# Patient Record
Sex: FEMALE | Race: ASIAN | NOT HISPANIC OR LATINO
[De-identification: names, ages, dates, MRNs, and addresses within clinical notes are randomized per-mention and may not be internally consistent; named-entity substitution may affect disease eponyms.]

---

## 2018-07-13 ENCOUNTER — APPOINTMENT (OUTPATIENT)
Dept: CARDIOLOGY | Facility: CLINIC | Age: 43
End: 2018-07-13
Payer: MEDICAID

## 2018-07-13 ENCOUNTER — NON-APPOINTMENT (OUTPATIENT)
Age: 43
End: 2018-07-13

## 2018-07-13 VITALS
BODY MASS INDEX: 26.2 KG/M2 | WEIGHT: 163 LBS | SYSTOLIC BLOOD PRESSURE: 127 MMHG | HEART RATE: 76 BPM | DIASTOLIC BLOOD PRESSURE: 85 MMHG | HEIGHT: 66 IN | OXYGEN SATURATION: 100 %

## 2018-07-13 PROCEDURE — 93000 ELECTROCARDIOGRAM COMPLETE: CPT

## 2018-07-13 PROCEDURE — 99214 OFFICE O/P EST MOD 30 MIN: CPT

## 2018-07-13 PROCEDURE — 93306 TTE W/DOPPLER COMPLETE: CPT

## 2018-07-22 ENCOUNTER — EMERGENCY (EMERGENCY)
Facility: HOSPITAL | Age: 43
LOS: 1 days | Discharge: HOME | End: 2018-07-22
Attending: EMERGENCY MEDICINE
Payer: MEDICAID

## 2018-07-22 VITALS
TEMPERATURE: 97 F | SYSTOLIC BLOOD PRESSURE: 123 MMHG | HEART RATE: 57 BPM | RESPIRATION RATE: 16 BRPM | DIASTOLIC BLOOD PRESSURE: 78 MMHG | HEIGHT: 66 IN | WEIGHT: 158.07 LBS | OXYGEN SATURATION: 100 %

## 2018-07-22 VITALS
TEMPERATURE: 98 F | OXYGEN SATURATION: 100 % | DIASTOLIC BLOOD PRESSURE: 54 MMHG | HEART RATE: 72 BPM | SYSTOLIC BLOOD PRESSURE: 104 MMHG | RESPIRATION RATE: 16 BRPM

## 2018-07-22 DIAGNOSIS — Z95.2 PRESENCE OF PROSTHETIC HEART VALVE: Chronic | ICD-10-CM

## 2018-07-22 LAB
ALBUMIN SERPL ELPH-MCNC: 3.7 G/DL — SIGNIFICANT CHANGE UP (ref 3.5–5)
ALP SERPL-CCNC: 94 U/L — SIGNIFICANT CHANGE UP (ref 40–120)
ALT FLD-CCNC: 22 U/L DA — SIGNIFICANT CHANGE UP (ref 10–60)
ANION GAP SERPL CALC-SCNC: 8 MMOL/L — SIGNIFICANT CHANGE UP (ref 5–17)
APPEARANCE UR: CLEAR — SIGNIFICANT CHANGE UP
AST SERPL-CCNC: 15 U/L — SIGNIFICANT CHANGE UP (ref 10–40)
BACTERIA # UR AUTO: ABNORMAL /HPF
BILIRUB SERPL-MCNC: 0.2 MG/DL — SIGNIFICANT CHANGE UP (ref 0.2–1.2)
BILIRUB UR-MCNC: NEGATIVE — SIGNIFICANT CHANGE UP
BUN SERPL-MCNC: 15 MG/DL — SIGNIFICANT CHANGE UP (ref 7–18)
CALCIUM SERPL-MCNC: 9 MG/DL — SIGNIFICANT CHANGE UP (ref 8.4–10.5)
CHLORIDE SERPL-SCNC: 105 MMOL/L — SIGNIFICANT CHANGE UP (ref 96–108)
CO2 SERPL-SCNC: 24 MMOL/L — SIGNIFICANT CHANGE UP (ref 22–31)
COLOR SPEC: YELLOW — SIGNIFICANT CHANGE UP
COMMENT - URINE: SIGNIFICANT CHANGE UP
CREAT SERPL-MCNC: 0.82 MG/DL — SIGNIFICANT CHANGE UP (ref 0.5–1.3)
DIFF PNL FLD: ABNORMAL
EPI CELLS # UR: ABNORMAL /HPF
GLUCOSE SERPL-MCNC: 129 MG/DL — HIGH (ref 70–99)
GLUCOSE UR QL: NEGATIVE — SIGNIFICANT CHANGE UP
HCT VFR BLD CALC: 36.9 % — SIGNIFICANT CHANGE UP (ref 34.5–45)
HGB BLD-MCNC: 11.6 G/DL — SIGNIFICANT CHANGE UP (ref 11.5–15.5)
HYALINE CASTS # UR AUTO: ABNORMAL /LPF
KETONES UR-MCNC: NEGATIVE — SIGNIFICANT CHANGE UP
LEUKOCYTE ESTERASE UR-ACNC: NEGATIVE — SIGNIFICANT CHANGE UP
LIDOCAIN IGE QN: 169 U/L — SIGNIFICANT CHANGE UP (ref 73–393)
MCHC RBC-ENTMCNC: 26.1 PG — LOW (ref 27–34)
MCHC RBC-ENTMCNC: 31.5 GM/DL — LOW (ref 32–36)
MCV RBC AUTO: 82.6 FL — SIGNIFICANT CHANGE UP (ref 80–100)
NITRITE UR-MCNC: NEGATIVE — SIGNIFICANT CHANGE UP
PH UR: 7 — SIGNIFICANT CHANGE UP (ref 5–8)
PLATELET # BLD AUTO: 304 K/UL — SIGNIFICANT CHANGE UP (ref 150–400)
POTASSIUM SERPL-MCNC: 4.2 MMOL/L — SIGNIFICANT CHANGE UP (ref 3.5–5.3)
POTASSIUM SERPL-SCNC: 4.2 MMOL/L — SIGNIFICANT CHANGE UP (ref 3.5–5.3)
PROT SERPL-MCNC: 7.7 G/DL — SIGNIFICANT CHANGE UP (ref 6–8.3)
PROT UR-MCNC: 15
RBC # BLD: 4.47 M/UL — SIGNIFICANT CHANGE UP (ref 3.8–5.2)
RBC # FLD: 15.4 % — HIGH (ref 10.3–14.5)
RBC CASTS # UR COMP ASSIST: SIGNIFICANT CHANGE UP /HPF (ref 0–2)
SODIUM SERPL-SCNC: 137 MMOL/L — SIGNIFICANT CHANGE UP (ref 135–145)
SP GR SPEC: 1.01 — SIGNIFICANT CHANGE UP (ref 1.01–1.02)
UROBILINOGEN FLD QL: NEGATIVE — SIGNIFICANT CHANGE UP
WBC # BLD: 13.4 K/UL — HIGH (ref 3.8–10.5)
WBC # FLD AUTO: 13.4 K/UL — HIGH (ref 3.8–10.5)
WBC UR QL: SIGNIFICANT CHANGE UP /HPF (ref 0–5)

## 2018-07-22 PROCEDURE — 83690 ASSAY OF LIPASE: CPT

## 2018-07-22 PROCEDURE — 71046 X-RAY EXAM CHEST 2 VIEWS: CPT

## 2018-07-22 PROCEDURE — 93005 ELECTROCARDIOGRAM TRACING: CPT

## 2018-07-22 PROCEDURE — 99283 EMERGENCY DEPT VISIT LOW MDM: CPT | Mod: 25

## 2018-07-22 PROCEDURE — 80053 COMPREHEN METABOLIC PANEL: CPT

## 2018-07-22 PROCEDURE — 81001 URINALYSIS AUTO W/SCOPE: CPT

## 2018-07-22 PROCEDURE — 85027 COMPLETE CBC AUTOMATED: CPT

## 2018-07-22 PROCEDURE — 99285 EMERGENCY DEPT VISIT HI MDM: CPT

## 2018-07-22 PROCEDURE — 71046 X-RAY EXAM CHEST 2 VIEWS: CPT | Mod: 26

## 2018-07-22 NOTE — ED ADULT NURSE NOTE - OBJECTIVE STATEMENT
pt came to er c/o abdominal pain , pain is mostly felt in the epigastric area. vomited x 1 in er .Labs pending

## 2018-07-22 NOTE — ED PROVIDER NOTE - MEDICAL DECISION MAKING DETAILS
41 y/o F pt with PMHx of heart problems. Pt worried about sx being related to extensive history of heart issues. Discussed with pt r/b/a for workup including xray and IV fluids. Pt agrees to ruling out any liver, pancreas, and kidney issues before moving forward. 43yo female with epigastric and abdo cramping w n/v. no cp sobm, lwo likelihood ACS given HPI, will cehck electrolyes and lipase. cxr for free air. dispo per

## 2018-07-22 NOTE — ED PROVIDER NOTE - PROGRESS NOTE DETAILS
sleeping, comfortable, abdo soft nontender  d/w pt lab results, copy provided, d/w rba of CT, declines at this time, will return if worsens.

## 2018-07-22 NOTE — ED PROVIDER NOTE - OBJECTIVE STATEMENT
43 y/o F pt with a PMHx of Heart Disease and a PSHx of Mitral Valve Replacement and Aortic Valve Replacement presents to ED c/o abdominal pain since yesterday. Pt also notes an episode of vomiting when she arrived to [facility]. Pt states that taking antacid provided some relief for her pain. Pt also states she had similar symptoms when she had a virus "a long time ago". Pt denies smoking or drug use. Allergies: Vancomycin (unknown), Penicillin (unknown). 41 y/o F pt with a PSHx of Mitral Valve Replacement and Aortic Valve Replacement presents to ED c/o abdominal pain since yesterday. Pt also notes an episode of vomiting when she arrived to [facility] ED. Pt states that taking antacid provided some relief for her pain. Pt also states she had similar symptoms when she had a virus "a long time ago". Pt denies smoking or drug use. Allergies: Vancomycin (unknown), Penicillin (unknown).  There is no CP SOB or fevers ro chills, No sick contacts, no injury or trauma, no travel.

## 2019-07-26 ENCOUNTER — NON-APPOINTMENT (OUTPATIENT)
Age: 44
End: 2019-07-26

## 2019-07-26 ENCOUNTER — APPOINTMENT (OUTPATIENT)
Dept: CARDIOLOGY | Facility: CLINIC | Age: 44
End: 2019-07-26
Payer: MEDICAID

## 2019-07-26 VITALS
OXYGEN SATURATION: 100 % | HEIGHT: 66 IN | SYSTOLIC BLOOD PRESSURE: 116 MMHG | RESPIRATION RATE: 16 BRPM | HEART RATE: 68 BPM | DIASTOLIC BLOOD PRESSURE: 80 MMHG | BODY MASS INDEX: 24.27 KG/M2 | WEIGHT: 151 LBS

## 2019-07-26 PROCEDURE — 93000 ELECTROCARDIOGRAM COMPLETE: CPT

## 2019-07-26 PROCEDURE — 99214 OFFICE O/P EST MOD 30 MIN: CPT

## 2020-01-07 ENCOUNTER — APPOINTMENT (OUTPATIENT)
Dept: GYNECOLOGIC ONCOLOGY | Facility: CLINIC | Age: 45
End: 2020-01-07
Payer: MEDICAID

## 2020-01-07 VITALS
DIASTOLIC BLOOD PRESSURE: 79 MMHG | BODY MASS INDEX: 24.93 KG/M2 | HEIGHT: 66 IN | HEART RATE: 72 BPM | WEIGHT: 155.13 LBS | SYSTOLIC BLOOD PRESSURE: 121 MMHG

## 2020-01-07 DIAGNOSIS — Z92.89 PERSONAL HISTORY OF OTHER MEDICAL TREATMENT: ICD-10-CM

## 2020-01-07 DIAGNOSIS — Z80.3 FAMILY HISTORY OF MALIGNANT NEOPLASM OF BREAST: ICD-10-CM

## 2020-01-07 DIAGNOSIS — Z78.9 OTHER SPECIFIED HEALTH STATUS: ICD-10-CM

## 2020-01-07 DIAGNOSIS — R97.8 OTHER ABNORMAL TUMOR MARKERS: ICD-10-CM

## 2020-01-07 PROCEDURE — 99205 OFFICE O/P NEW HI 60 MIN: CPT

## 2020-01-07 NOTE — OB HISTORY
[Total Preg ___] : : [unfilled] [Full Term ___] : [unfilled] (full-term) [Living ___] : [unfilled] (living) [Vaginal ___] : [unfilled] vaginal delivery(s) [AB Spont ___] : [unfilled] miscarriage(s) [Definite ___ (Date)] : the last menstrual period was [unfilled] [Menarche Age ____] : age at menarche was [unfilled]

## 2020-01-13 NOTE — LETTER BODY
[I had the pleasure of evaluating your patient, [unfilled] for ___] : I had the pleasure of evaluating your patient, [unfilled] for [unfilled]. [Dear  ___] : Dear  [unfilled], [Attached please find my note.] : Attached please find my note.

## 2020-01-13 NOTE — HISTORY OF PRESENT ILLNESS
[FreeTextEntry1] : Patient is a 44 y.o. G 4  P  3  being referred by Dr. Dr. Gonzalez for the evaluation and\par management of elevated tumor markers, heavy irregular bleeding.\par \par Patient states has had irregular bleeding X's 1 year (worsened) with abnormal tumor markers, and\par uterine polyps. GYN is unsure regarding hysterectomy and source of abn tumor markers\par Hx of blood transfusion due to valve surgery.\par \par Pain +dysmenorrhea\par Menses q 2 weeks X's 2-3 weeks X's 1 year\par Pressure/bloating both\par Change in bowel/bladder habits constipated due to iron supplements\par Sexually active denies\par \par 11/1/2019 Endometrial Polyp ([place])\par Proliferative endometrium. No polyp seen\par \par 11/26/2019 MRI Pelvis [facility]\par Impression:\par Enlarged uterus 13.3 x 9.3 x 9.7 cm with adenomyosis. No discrete measurable fibroid\par An endometrial polyp 2 x 0.7 x 0.9 cm\par Simple cyst left ovary 2.9 x 2.6 x 2.4 cm consistent with a dominant follicle or corpus luteum\par Trace amount of fluid within the pelvis most likely physiologic in nature\par \par \par 10/29/19 [facility] Maternal fetal medicine\par 3.3 cm fibroid\par 2.4 cm left ovarian cyst\par free fluid\par Endo 16 with 0.5 cm polyp and cystic lesion increased in vascularity\par \par 12/6/2019 Tumor Markers\par ROBERT premenopausal 1.94 (H) <1.31\par                           202 (H)  <35\par HE4,Ovarian CA              74\par \par 12/6/2019 \par                            343 (H)  <35\par 11/1/2019 9/23/19\par                            175 (H)  <35          103 (H)\par \par 12/6/2019 ROBERT                                               9/23/19\par ROBERT Premenopausal    121                              1.63(H)\par                             113 (H) <35                   78(H)\par HE4,Ovarian CA                60                                70\par \par 9/23/2019  H&H:    9.5&31.6\par \par Myriad My Risk BRCA: negative

## 2020-01-13 NOTE — REVIEW OF SYSTEMS
[Negative] : Musculoskeletal [Abn Vag Bleeding] : abnormal vaginal bleeding [FreeTextEntry4] : as per HPI

## 2020-01-13 NOTE — CHIEF COMPLAINT
[FreeTextEntry1] : Referred by Dr. Dr. Gonzalez for the evaluation and management of elevated tumor markers & heavy irregular bleeding.

## 2020-01-13 NOTE — DISCUSSION/SUMMARY
[Reviewed Clinical Lab Test(s)] : Results of clinical tests were reviewed. [Reviewed Radiology Report(s)] : Radiology reports were reviewed. [FreeTextEntry1] : \par Plan progesterone IUD insertion once insurance approval, as patient is hesitant to consider definitive hysterectomy after thorough discussion of the medical & surgical management options for premenopausal menometrorrhagia,  Consider hysteroscopic removal of the polyp with IUD insertion.\par \par The nature of the  & ROBERT tests were discussed including the risk of false positive values in the setting of adenomyosis, endometriosis, inflammation.  Both current imaging modalities do not have findings suspicious for malignancy, however I explained that future diagnostic laparoscopy may be necessary for further evaluation.  Plan short interval repeat imaging (3 months from previous) to confirm ovarian cyst resolution & stability of the findings with repeat  at next office visit in 2 months.  The need for surgical intervention will determined at that time based on review of the results.  \par \par She verbalized an understanding and her questions were answered to her apparent satisfaction.

## 2020-01-13 NOTE — PHYSICAL EXAM
[Normal] : Recto-Vaginal Exam: Normal [FreeTextEntry1] : Pleasant, comfortable [de-identified] : no mass palpated [de-identified] : globular 12 week sized, anteverted, mobile, nontender [de-identified] : No rectovaginal nodularity

## 2020-01-22 ENCOUNTER — OUTPATIENT (OUTPATIENT)
Dept: OUTPATIENT SERVICES | Facility: HOSPITAL | Age: 45
LOS: 1 days | End: 2020-01-22
Payer: MEDICAID

## 2020-01-22 VITALS
WEIGHT: 151.9 LBS | OXYGEN SATURATION: 99 % | HEART RATE: 68 BPM | DIASTOLIC BLOOD PRESSURE: 60 MMHG | HEIGHT: 66 IN | SYSTOLIC BLOOD PRESSURE: 120 MMHG | RESPIRATION RATE: 18 BRPM | TEMPERATURE: 99 F

## 2020-01-22 DIAGNOSIS — N92.4 EXCESSIVE BLEEDING IN THE PREMENOPAUSAL PERIOD: ICD-10-CM

## 2020-01-22 DIAGNOSIS — Z95.2 PRESENCE OF PROSTHETIC HEART VALVE: Chronic | ICD-10-CM

## 2020-01-22 LAB
ANION GAP SERPL CALC-SCNC: 9 MMO/L — SIGNIFICANT CHANGE UP (ref 7–14)
BUN SERPL-MCNC: 13 MG/DL — SIGNIFICANT CHANGE UP (ref 7–23)
CALCIUM SERPL-MCNC: 9.1 MG/DL — SIGNIFICANT CHANGE UP (ref 8.4–10.5)
CHLORIDE SERPL-SCNC: 104 MMOL/L — SIGNIFICANT CHANGE UP (ref 98–107)
CO2 SERPL-SCNC: 25 MMOL/L — SIGNIFICANT CHANGE UP (ref 22–31)
CREAT SERPL-MCNC: 0.74 MG/DL — SIGNIFICANT CHANGE UP (ref 0.5–1.3)
GLUCOSE SERPL-MCNC: 106 MG/DL — HIGH (ref 70–99)
HCT VFR BLD CALC: 32 % — LOW (ref 34.5–45)
HGB BLD-MCNC: 9.8 G/DL — LOW (ref 11.5–15.5)
MCHC RBC-ENTMCNC: 27.1 PG — SIGNIFICANT CHANGE UP (ref 27–34)
MCHC RBC-ENTMCNC: 30.6 % — LOW (ref 32–36)
MCV RBC AUTO: 88.6 FL — SIGNIFICANT CHANGE UP (ref 80–100)
NRBC # FLD: 0 K/UL — SIGNIFICANT CHANGE UP (ref 0–0)
PLATELET # BLD AUTO: 445 K/UL — HIGH (ref 150–400)
PMV BLD: 10.9 FL — SIGNIFICANT CHANGE UP (ref 7–13)
POTASSIUM SERPL-MCNC: 4 MMOL/L — SIGNIFICANT CHANGE UP (ref 3.5–5.3)
POTASSIUM SERPL-SCNC: 4 MMOL/L — SIGNIFICANT CHANGE UP (ref 3.5–5.3)
RBC # BLD: 3.61 M/UL — LOW (ref 3.8–5.2)
RBC # FLD: 16.2 % — HIGH (ref 10.3–14.5)
SODIUM SERPL-SCNC: 138 MMOL/L — SIGNIFICANT CHANGE UP (ref 135–145)
WBC # BLD: 8.06 K/UL — SIGNIFICANT CHANGE UP (ref 3.8–10.5)
WBC # FLD AUTO: 8.06 K/UL — SIGNIFICANT CHANGE UP (ref 3.8–10.5)

## 2020-01-22 PROCEDURE — 93010 ELECTROCARDIOGRAM REPORT: CPT

## 2020-01-22 RX ORDER — SODIUM CHLORIDE 9 MG/ML
1000 INJECTION, SOLUTION INTRAVENOUS
Refills: 0 | Status: DISCONTINUED | OUTPATIENT
Start: 2020-01-27 | End: 2020-02-11

## 2020-01-22 NOTE — H&P PST ADULT - NS PRO FEM REPRO PAP RESULTS
Refill Request    Medication:     pravastatin (PRAVACHOL) 20 MG tablet 90 tablet 0 10/17/2017     Sig - Route: TAKE 1 TABLET BY MOUTH Â DAILY - Oral      LOV: 11/9/17 (Armen Mckeon) Mark Haynes  Next Mark Haynes due 5/2018     MWV: 6/5/17 Armen Mckeon)     Lipid: 8/8/16       Okay to give pt refill until he is due for MWV and lipid can be drawn then? Please advise.
Refilled per MD. Reminded pt about [address] in 6/2018.
Yes
normal

## 2020-01-22 NOTE — H&P PST ADULT - GASTROINTESTINAL DETAILS
no distention/no masses palpable/bowel sounds normal/no guarding/no bruit/nontender/no organomegaly/no rebound tenderness/no rigidity/soft

## 2020-01-22 NOTE — H&P PST ADULT - RS GEN PE MLT RESP DETAILS PC
breath sounds equal/respirations non-labored/normal/clear to auscultation bilaterally/no rales/no wheezes/no subcutaneous emphysema/airway patent/no rhonchi/no chest wall tenderness/no intercostal retractions/good air movement

## 2020-01-22 NOTE — H&P PST ADULT - NSICDXPROBLEM_GEN_ALL_CORE_FT
PROBLEM DIAGNOSES  Problem: Excessive bleeding in the premenopausal period  Assessment and Plan: preop instructions provided including npo staus, pepcid. C/W meds as ordered, states clindamycin as ordered by pcp for preop prophylaxis. Stop NSAIDs today, ASA stop tomorrow as istriucted by cardio, (will adress on [facility] record), Allergies reported to OR booking via fax. [facility] records pending.

## 2020-01-22 NOTE — H&P PST ADULT - NSANTHOSAYNRD_GEN_A_CORE
No. SYDNIE screening performed.  STOP BANG Legend: 0-2 = LOW Risk; 3-4 = INTERMEDIATE Risk; 5-8 = HIGH Risk/never tested

## 2020-01-22 NOTE — H&P PST ADULT - NEGATIVE GENERAL GENITOURINARY SYMPTOMS
no flank pain L/no flank pain R/no bladder infections/no urine discoloration/normal libido/no gas in urine/no dysuria/no urinary hesitancy/normal urinary frequency/no incontinence/no hematuria/no nocturia/no renal colic

## 2020-01-22 NOTE — H&P PST ADULT - HISTORY OF PRESENT ILLNESS
45 y/o female presents to PST w/ a preop dx of excessive bleeding in the premenopausal period.   Pt states bleeding w/ menses excesive, cyst in ovary, polyp noted on US and MRI recently. Pt now recommended D&C hysteroscopy removal of polyp and iud insertion at this time. 43 y/o female presents to PST w/ a preop dx of excessive bleeding in the premenopausal period.   Pt states excessive bleeding w/ menses, f/u w/ gyn and cyst in ovary and uterine polyp noted on US and MRI recently. Pt now recommended D&C hysteroscopy removal of polyp and iud insertion w/ Dr Avendaño. 43 y/o female presents to PST w/ a preop dx of excessive bleeding in the premenopausal period.   Pt states excessive bleeding w/ menses, f/u w/ gyn and cyst in ovary and uterine polyp noted on US and MRI recently. Pt now recommended D&C hysteroscopy removal of polyp and progesterone iud insertion w/ Dr Avendaño.

## 2020-01-26 ENCOUNTER — TRANSCRIPTION ENCOUNTER (OUTPATIENT)
Age: 45
End: 2020-01-26

## 2020-01-27 ENCOUNTER — APPOINTMENT (OUTPATIENT)
Dept: GYNECOLOGIC ONCOLOGY | Facility: HOSPITAL | Age: 45
End: 2020-01-27

## 2020-01-27 ENCOUNTER — RESULT REVIEW (OUTPATIENT)
Age: 45
End: 2020-01-27

## 2020-01-27 ENCOUNTER — OUTPATIENT (OUTPATIENT)
Dept: OUTPATIENT SERVICES | Facility: HOSPITAL | Age: 45
LOS: 1 days | Discharge: HOME | End: 2020-01-27
Payer: MEDICAID

## 2020-01-27 VITALS
OXYGEN SATURATION: 100 % | HEIGHT: 66 IN | RESPIRATION RATE: 14 BRPM | SYSTOLIC BLOOD PRESSURE: 118 MMHG | DIASTOLIC BLOOD PRESSURE: 61 MMHG | TEMPERATURE: 98 F | HEART RATE: 68 BPM | WEIGHT: 151.9 LBS

## 2020-01-27 VITALS
RESPIRATION RATE: 13 BRPM | HEART RATE: 62 BPM | OXYGEN SATURATION: 100 % | SYSTOLIC BLOOD PRESSURE: 110 MMHG | DIASTOLIC BLOOD PRESSURE: 64 MMHG

## 2020-01-27 DIAGNOSIS — N92.4 EXCESSIVE BLEEDING IN THE PREMENOPAUSAL PERIOD: ICD-10-CM

## 2020-01-27 DIAGNOSIS — Z95.2 PRESENCE OF PROSTHETIC HEART VALVE: Chronic | ICD-10-CM

## 2020-01-27 PROCEDURE — 88305 TISSUE EXAM BY PATHOLOGIST: CPT | Mod: 26

## 2020-01-27 PROCEDURE — 58558 HYSTEROSCOPY BIOPSY: CPT | Mod: GC

## 2020-01-27 PROCEDURE — 58300 INSERT INTRAUTERINE DEVICE: CPT | Mod: GC

## 2020-01-27 RX ORDER — OXYCODONE HYDROCHLORIDE 5 MG/1
5 TABLET ORAL ONCE
Refills: 0 | Status: DISCONTINUED | OUTPATIENT
Start: 2020-01-27 | End: 2020-01-27

## 2020-01-27 RX ORDER — FENTANYL CITRATE 50 UG/ML
25 INJECTION INTRAVENOUS
Refills: 0 | Status: DISCONTINUED | OUTPATIENT
Start: 2020-01-27 | End: 2020-01-27

## 2020-01-27 RX ORDER — IBUPROFEN 200 MG
1 TABLET ORAL
Qty: 0 | Refills: 0 | DISCHARGE
End: 2020-01-22

## 2020-01-27 RX ORDER — ACETAMINOPHEN 500 MG
1000 TABLET ORAL ONCE
Refills: 0 | Status: DISCONTINUED | OUTPATIENT
Start: 2020-01-27 | End: 2020-02-11

## 2020-01-27 RX ORDER — SODIUM CHLORIDE 9 MG/ML
1000 INJECTION, SOLUTION INTRAVENOUS
Refills: 0 | Status: DISCONTINUED | OUTPATIENT
Start: 2020-01-27 | End: 2020-02-11

## 2020-01-27 NOTE — BRIEF OPERATIVE NOTE - OPERATION/FINDINGS
Uterus sounded to 11cm. Diagnostic hysteroscopy performed, visualization limited due to multiple large blood clots in cavity. R fundal polyp noted, polypoid tissue noted on posterior wall. Polyp removed using polyp forceps, measured 2cm. D&C performed, specimen placed on telfa. Hysteroscopy reintroduced, cavity WNL. Mirena IUD placed, strings trimmed to 2cm. Lot #[id number] Exp 3/2022

## 2020-01-27 NOTE — ASU DISCHARGE PLAN (ADULT/PEDIATRIC) - CARE PROVIDER_API CALL
Bailey Avendaño)  Gynecologic Oncology; Obstetrics and Gynecology  [facility], [address]th [place] [address]  Phone: [phone] option 2  Fax: [phone]  Follow Up Time:

## 2020-01-27 NOTE — BRIEF OPERATIVE NOTE - NSICDXBRIEFPROCEDURE_GEN_ALL_CORE_FT
PROCEDURES:  IUD insertion 27-Jan-2020 12:48:59  Tim Thornton  Uterine polypectomy 27-Jan-2020 12:48:53  Tim Thornton  Diagnostic hysteroscopy with dilation and curettage of uterus 27-Jan-2020 12:48:44  Tim Thornton

## 2020-01-27 NOTE — BRIEF OPERATIVE NOTE - ANTIBIOTIC PROTOCOL
Exception to protocol/Clindamycin prescribed for pt preop by PCP 2/2 to aortic and mitral valve replacements

## 2020-01-27 NOTE — BRIEF OPERATIVE NOTE - NSICDXBRIEFPREOP_GEN_ALL_CORE_FT
PRE-OP DIAGNOSIS:  Menorrhagia 27-Jan-2020 12:49:15  Tim Thornton  Endometrial polyp 27-Jan-2020 12:49:24  Tim Thornton

## 2020-01-27 NOTE — ASU DISCHARGE PLAN (ADULT/PEDIATRIC) - CALL YOUR DOCTOR IF YOU HAVE ANY OF THE FOLLOWING:
Numbness, tingling, color or temperature change to extremity/Excessive diarrhea/Wound/Surgical Site with redness, or foul smelling discharge or pus/Nausea and vomiting that does not stop/Pain not relieved by Medications/Increased irritability or sluggishness/Unable to urinate/Swelling that gets worse/Inability to tolerate liquids or foods/Bleeding that does not stop Fever greater than (need to indicate Fahrenheit or Celsius)/Wound/Surgical Site with redness, or foul smelling discharge or pus/Inability to tolerate liquids or foods/Swelling that gets worse/Increased irritability or sluggishness/Excessive diarrhea/Numbness, tingling, color or temperature change to extremity/Pain not relieved by Medications/Nausea and vomiting that does not stop/Unable to urinate/Bleeding that does not stop

## 2020-01-29 LAB — SURGICAL PATHOLOGY STUDY: SIGNIFICANT CHANGE UP

## 2020-02-11 ENCOUNTER — APPOINTMENT (OUTPATIENT)
Dept: GYNECOLOGIC ONCOLOGY | Facility: CLINIC | Age: 45
End: 2020-02-11
Payer: MEDICAID

## 2020-02-11 VITALS
WEIGHT: 153 LBS | BODY MASS INDEX: 24.59 KG/M2 | DIASTOLIC BLOOD PRESSURE: 81 MMHG | SYSTOLIC BLOOD PRESSURE: 126 MMHG | HEART RATE: 71 BPM | TEMPERATURE: 98.3 F | HEIGHT: 66 IN

## 2020-02-11 DIAGNOSIS — N83.202 UNSPECIFIED OVARIAN CYST, LEFT SIDE: ICD-10-CM

## 2020-02-11 DIAGNOSIS — R97.1 ELEVATED CANCER ANTIGEN 125 [CA 125]: ICD-10-CM

## 2020-02-11 PROCEDURE — 99212 OFFICE O/P EST SF 10 MIN: CPT

## 2020-03-29 ENCOUNTER — OUTPATIENT (OUTPATIENT)
Dept: OUTPATIENT SERVICES | Facility: HOSPITAL | Age: 45
LOS: 1 days | End: 2020-03-29

## 2020-03-29 DIAGNOSIS — Z95.2 PRESENCE OF PROSTHETIC HEART VALVE: Chronic | ICD-10-CM

## 2020-03-29 DIAGNOSIS — R97.8 OTHER ABNORMAL TUMOR MARKERS: ICD-10-CM

## 2020-03-29 DIAGNOSIS — N84.0 POLYP OF CORPUS UTERI: ICD-10-CM

## 2020-05-09 ENCOUNTER — OUTPATIENT (OUTPATIENT)
Dept: OUTPATIENT SERVICES | Facility: HOSPITAL | Age: 45
LOS: 1 days | End: 2020-05-09
Payer: COMMERCIAL

## 2020-05-09 ENCOUNTER — APPOINTMENT (OUTPATIENT)
Dept: MRI IMAGING | Facility: IMAGING CENTER | Age: 45
End: 2020-05-09
Payer: MEDICAID

## 2020-05-09 ENCOUNTER — RESULT REVIEW (OUTPATIENT)
Age: 45
End: 2020-05-09

## 2020-05-09 DIAGNOSIS — Z95.2 PRESENCE OF PROSTHETIC HEART VALVE: Chronic | ICD-10-CM

## 2020-05-09 DIAGNOSIS — N84.0 POLYP OF CORPUS UTERI: ICD-10-CM

## 2020-05-09 DIAGNOSIS — R97.8 OTHER ABNORMAL TUMOR MARKERS: ICD-10-CM

## 2020-05-09 PROCEDURE — 72197 MRI PELVIS W/O & W/DYE: CPT | Mod: 26

## 2020-05-09 PROCEDURE — A9585: CPT

## 2020-05-09 PROCEDURE — 72197 MRI PELVIS W/O & W/DYE: CPT

## 2020-05-11 PROBLEM — N92.4 ABNORMAL VAGINAL BLEEDING IN PREMENOPAUSAL PATIENT: Status: ACTIVE | Noted: 2020-01-13

## 2020-05-12 ENCOUNTER — APPOINTMENT (OUTPATIENT)
Dept: GYNECOLOGIC ONCOLOGY | Facility: CLINIC | Age: 45
End: 2020-05-12
Payer: MEDICAID

## 2020-05-12 VITALS
BODY MASS INDEX: 23.3 KG/M2 | HEIGHT: 66 IN | WEIGHT: 145 LBS | DIASTOLIC BLOOD PRESSURE: 75 MMHG | HEART RATE: 84 BPM | SYSTOLIC BLOOD PRESSURE: 110 MMHG

## 2020-05-12 DIAGNOSIS — N80.0 ENDOMETRIOSIS OF UTERUS: ICD-10-CM

## 2020-05-12 DIAGNOSIS — N92.4 EXCESSIVE BLEEDING IN THE PREMENOPAUSAL PERIOD: ICD-10-CM

## 2020-05-12 PROCEDURE — 99213 OFFICE O/P EST LOW 20 MIN: CPT

## 2020-05-14 ENCOUNTER — ASOB RESULT (OUTPATIENT)
Age: 45
End: 2020-05-14

## 2020-05-14 ENCOUNTER — APPOINTMENT (OUTPATIENT)
Dept: OBGYN | Facility: CLINIC | Age: 45
End: 2020-05-14
Payer: MEDICAID

## 2020-05-14 PROCEDURE — 76830 TRANSVAGINAL US NON-OB: CPT

## 2020-05-17 PROBLEM — N80.0 UTERUS, ADENOMYOSIS: Status: ACTIVE | Noted: 2020-05-17

## 2020-05-17 NOTE — HISTORY OF PRESENT ILLNESS
[FreeTextEntry1] : 44 year old with LMP = 4/16/20 return for review of MRI pelvis result & labs.\par She offers no new complaints.  States her abnormal uterine bleeding has much improved with regular monthly menses & no intermenstrual bleeding since insertion of levonorgestrel IUD & polypectomy.  She states current menses lasts ~ 6 days with 2 heavy days characterized as passing clots and requires thick vaginal pad or adult diaper.  She was previously considering endometrial ablation with her Gynecologist, Dr. 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

## 2020-05-17 NOTE — LETTER BODY
[Dear  ___] : Dear  [unfilled], [I recently saw our patient [unfilled] for a follow-up visit.] : I recently saw our patient, [unfilled] for a follow-up visit. [Attached please find my note.] : Attached please find my note. [FreeTextEntry1] : MRI pelvis 5/9/20

## 2020-05-17 NOTE — PHYSICAL EXAM
[Normal] : Recto-Vaginal Exam: Normal [de-identified] : soft [de-identified] : IUD strings not visible - mucus at external os [de-identified] : globular 10-12 week sized uterus, nontender [de-identified] : No rectovaginal nodularity or masses

## 2020-05-17 NOTE — REASON FOR VISIT
[FreeTextEntry1] : Pt here for a f/u elevated  & left ovarian cyst on imaging in 11/2019\par \par S/P Diagnostic hysteroscopy/removal of polyp/ D & C/ Insertion of Mirena IUD- 1/27/2020

## 2020-07-10 NOTE — PHYSICAL EXAM
[Normal Appearance] : normal appearance [General Appearance - Well Developed] : well developed [Well Groomed] : well groomed [General Appearance - Well Nourished] : well nourished [Normal Conjunctiva] : the conjunctiva exhibited no abnormalities [General Appearance - In No Acute Distress] : no acute distress [No Oral Cyanosis] : no oral cyanosis [No Oral Pallor] : no oral pallor [Normal Jugular Venous V Waves Present] : normal jugular venous V waves present [No Jugular Venous Romano A Waves] : no jugular venous romano A waves [Exaggerated Use Of Accessory Muscles For Inspiration] : no accessory muscle use [Respiration, Rhythm And Depth] : normal respiratory rhythm and effort [Auscultation Breath Sounds / Voice Sounds] : lungs were clear to auscultation bilaterally [Heart Sounds] : normal S1 and S2 [Heart Rate And Rhythm] : heart rate and rhythm were normal [Arterial Pulses Normal] : the arterial pulses were normal [Edema] : no peripheral edema present [Systolic grade ___/6] : A grade [unfilled]/6 systolic murmur was heard. [Abdomen Soft] : soft [Abdomen Tenderness] : non-tender [Abnormal Walk] : normal gait [Nail Clubbing] : no clubbing of the fingernails [Cyanosis, Localized] : no localized cyanosis [] : no rash [Skin Turgor] : normal skin turgor [Affect] : the affect was normal [Oriented To Time, Place, And Person] : oriented to person, place, and time [Impaired Insight] : insight and judgment were intact [Memory Recent] : recent memory was not impaired [FreeTextEntry1] : mild varicose veins

## 2020-07-17 ENCOUNTER — APPOINTMENT (OUTPATIENT)
Dept: CARDIOLOGY | Facility: CLINIC | Age: 45
End: 2020-07-17
Payer: MEDICAID

## 2020-07-17 PROCEDURE — 93306 TTE W/DOPPLER COMPLETE: CPT

## 2020-07-24 ENCOUNTER — APPOINTMENT (OUTPATIENT)
Dept: CARDIOLOGY | Facility: CLINIC | Age: 45
End: 2020-07-24
Payer: MEDICAID

## 2020-07-24 ENCOUNTER — NON-APPOINTMENT (OUTPATIENT)
Age: 45
End: 2020-07-24

## 2020-07-24 ENCOUNTER — APPOINTMENT (OUTPATIENT)
Dept: CARDIOLOGY | Facility: CLINIC | Age: 45
End: 2020-07-24

## 2020-07-24 VITALS
WEIGHT: 150 LBS | TEMPERATURE: 98.4 F | DIASTOLIC BLOOD PRESSURE: 76 MMHG | HEART RATE: 73 BPM | SYSTOLIC BLOOD PRESSURE: 120 MMHG | BODY MASS INDEX: 24.21 KG/M2 | OXYGEN SATURATION: 100 %

## 2020-07-24 DIAGNOSIS — R09.89 OTHER SPECIFIED SYMPTOMS AND SIGNS INVOLVING THE CIRCULATORY AND RESPIRATORY SYSTEMS: ICD-10-CM

## 2020-07-24 PROCEDURE — 93880 EXTRACRANIAL BILAT STUDY: CPT

## 2020-07-24 PROCEDURE — 93000 ELECTROCARDIOGRAM COMPLETE: CPT

## 2020-07-24 PROCEDURE — 99214 OFFICE O/P EST MOD 30 MIN: CPT

## 2020-12-23 ENCOUNTER — RESULT REVIEW (OUTPATIENT)
Age: 45
End: 2020-12-23

## 2021-05-29 NOTE — H&P PST ADULT - NEGATIVE FEMALE-SPECIFIC SYMPTOMS
Vancomycin Assessment    Nguyen Greenfield is a 65 y o  female who is currently receiving vancomycin for Bone/Joint Infection   Relevant clinical data and objective history reviewed:  Creatinine   Date Value Ref Range Status   05/29/2021 1 37 (H) 0 60 - 1 30 mg/dL Final     Comment:     Standardized to IDMS reference method   05/29/2021 1 48 (H) 0 60 - 1 30 mg/dL Final     Comment:     Standardized to IDMS reference method   05/05/2021 1 18 0 60 - 1 30 mg/dL Final     Comment:     Standardized to IDMS reference method   10/12/2015 0 93 0 60 - 1 30 mg/dL Final     Comment:     Standardized to IDMS reference method   07/09/2015 0 91 0 60 - 1 30 mg/dL Final     Comment:     Standardized to IDMS reference method   04/14/2015 0 89 0 60 - 1 30 mg/dL Final     Comment:     Standardized to IDMS reference method     /71   Pulse 81   Temp 98 7 °F (37 1 °C) (Oral)   Resp 18   Ht 5' 1" (1 549 m)   Wt 55 3 kg (122 lb)   SpO2 98%   BMI 23 05 kg/m²   I/O last 3 completed shifts: In: 2000 [I V :2000]  Out: 350 [Urine:350]  Lab Results   Component Value Date/Time    BUN 18 05/29/2021 08:31 AM    BUN 20 03/09/2020 10:47 AM    WBC 7 32 05/29/2021 02:12 AM    WBC 4 11 (L) 10/12/2015 08:56 AM    HGB 9 0 (L) 05/29/2021 02:12 AM    HGB 10 8 (L) 10/12/2015 08:56 AM    HCT 27 3 (L) 05/29/2021 02:12 AM    HCT 33 8 (L) 10/12/2015 08:56 AM     (H) 05/29/2021 02:12 AM     (H) 10/12/2015 08:56 AM     05/29/2021 02:12 AM     10/12/2015 08:56 AM     Temp Readings from Last 3 Encounters:   05/29/21 98 7 °F (37 1 °C) (Oral)   05/05/21 98 7 °F (37 1 °C) (Oral)   01/15/21 97 5 °F (36 4 °C) (Temporal)     Vancomycin Days of Therapy: 1    Assessment/Plan  The patient is currently on vancomycin utilizing scheduled dosing based on actual body weight  Baseline risks associated with therapy include: pre-existing renal impairment, concomitant nephrotoxic medications, and advanced age    The patient was Loaded with 20mg/kg (1000mg) and after clinical evaluation will be changed to 750mg IV q24h  Pharmacy will also follow closely for s/sx of nephrotoxicity, infusion reactions, and appropriateness of therapy  BMP and CBC will be ordered per protocol  Plan for trough as patient approaches steady state, prior to the 4th  dose at approximately 0930 on 6/1/21  Due to infection severity, will target a trough of 15-20 (appropriate for most indications)   Pharmacy will continue to follow the patients culture results and clinical progress daily      Jaleesa Garcia, Pharmacist no irregular menses/no vaginal discharge

## 2021-07-23 ENCOUNTER — NON-APPOINTMENT (OUTPATIENT)
Age: 46
End: 2021-07-23

## 2021-07-23 ENCOUNTER — APPOINTMENT (OUTPATIENT)
Dept: CARDIOLOGY | Facility: CLINIC | Age: 46
End: 2021-07-23
Payer: MEDICAID

## 2021-07-23 VITALS
TEMPERATURE: 98.5 F | WEIGHT: 156 LBS | BODY MASS INDEX: 25.07 KG/M2 | OXYGEN SATURATION: 98 % | SYSTOLIC BLOOD PRESSURE: 115 MMHG | RESPIRATION RATE: 16 BRPM | HEIGHT: 66 IN | HEART RATE: 57 BPM | DIASTOLIC BLOOD PRESSURE: 70 MMHG

## 2021-07-23 DIAGNOSIS — N84.0 POLYP OF CORPUS UTERI: ICD-10-CM

## 2021-07-23 PROCEDURE — 93306 TTE W/DOPPLER COMPLETE: CPT

## 2021-07-23 PROCEDURE — 99214 OFFICE O/P EST MOD 30 MIN: CPT

## 2021-07-23 PROCEDURE — 93000 ELECTROCARDIOGRAM COMPLETE: CPT

## 2021-08-29 ENCOUNTER — RESULT CHARGE (OUTPATIENT)
Age: 46
End: 2021-08-29

## 2021-08-30 NOTE — CARDIOLOGY SUMMARY
[de-identified] : 7/23/2021:  Sinus  Bradycardia \par Right bundle branch block. [de-identified] : 7/23/2021:  LVEF  [de-identified] : Carotid Dopplers 7/24/2021: normal with no plaque

## 2021-08-30 NOTE — PHYSICAL EXAM
[Well Developed] : well developed [Well Nourished] : well nourished [No Acute Distress] : no acute distress [Normal Conjunctiva] : normal conjunctiva [Normal Venous Pressure] : normal venous pressure [Normal S1, S2] : normal S1, S2 [Murmur] : murmur [No Gallop] : no gallop [Clear Lung Fields] : clear lung fields [Good Air Entry] : good air entry [No Respiratory Distress] : no respiratory distress  [Non Tender] : non-tender [Soft] : abdomen soft [Normal Bowel Sounds] : normal bowel sounds [Normal Gait] : normal gait [No Edema] : no edema [No Cyanosis] : no cyanosis [No Varicosities] : no varicosities [No Rash] : no rash [No Skin Lesions] : no skin lesions [Moves all extremities] : moves all extremities [No Focal Deficits] : no focal deficits [Alert and Oriented] : alert and oriented [Normal Speech] : normal speech [Normal memory] : normal memory [de-identified] : transmitted flow loudest on left [de-identified] : 3/6 systolic murmur loudest at left upper sternal border [de-identified] : spider veins on thighs

## 2021-08-30 NOTE — HISTORY OF PRESENT ILLNESS
[FreeTextEntry1] : 45 year old woman with a history of rheumatic heart disease S/P MVR then reop bio MVR/AVR in 2011 presents for routine follow up. Echocardiogram done today with normal functioning bioprosthesis and normal LV function. She states she is feeling well. She denies any symptoms of chest pain, dyspnea, orthopnea, palpitations, activity intolerance, edema, near syncope or syncope. Her menses has returned to normal after D&C and hysteroscopy and she is no longer anemic.  An endometrial polyp was removed.

## 2021-08-30 NOTE — REVIEW OF SYSTEMS
[Easy Bruising] : a tendency for easy bruising [Negative] : Psychiatric [SOB] : no shortness of breath [Dyspnea on exertion] : not dyspnea during exertion [Chest Discomfort] : no chest discomfort [Lower Ext Edema] : no extremity edema [Leg Claudication] : no intermittent leg claudication [Palpitations] : no palpitations [Orthopnea] : no orthopnea [PND] : no PND [Syncope] : no syncope [FreeTextEntry4] : seasonal allergies [FreeTextEntry8] : normal menses now with no further anemia [FreeTextEntry9] : low back pain

## 2022-07-24 ENCOUNTER — NON-APPOINTMENT (OUTPATIENT)
Age: 47
End: 2022-07-24

## 2022-07-28 ENCOUNTER — RESULT CHARGE (OUTPATIENT)
Age: 47
End: 2022-07-28

## 2022-07-29 ENCOUNTER — APPOINTMENT (OUTPATIENT)
Dept: CARDIOLOGY | Facility: CLINIC | Age: 47
End: 2022-07-29

## 2022-07-29 ENCOUNTER — NON-APPOINTMENT (OUTPATIENT)
Age: 47
End: 2022-07-29

## 2022-07-29 VITALS
HEIGHT: 66 IN | BODY MASS INDEX: 27.16 KG/M2 | DIASTOLIC BLOOD PRESSURE: 81 MMHG | WEIGHT: 169 LBS | SYSTOLIC BLOOD PRESSURE: 126 MMHG | OXYGEN SATURATION: 99 % | HEART RATE: 70 BPM | TEMPERATURE: 98.8 F

## 2022-07-29 DIAGNOSIS — I09.9 RHEUMATIC HEART DISEASE, UNSPECIFIED: ICD-10-CM

## 2022-07-29 DIAGNOSIS — F41.9 ANXIETY DISORDER, UNSPECIFIED: ICD-10-CM

## 2022-07-29 PROCEDURE — 93000 ELECTROCARDIOGRAM COMPLETE: CPT

## 2022-07-29 PROCEDURE — 93306 TTE W/DOPPLER COMPLETE: CPT

## 2022-07-29 PROCEDURE — 99214 OFFICE O/P EST MOD 30 MIN: CPT | Mod: 25

## 2022-08-11 NOTE — CARDIOLOGY SUMMARY
[de-identified] : 7/29/2022:  Sinus  Rhythm \par Right bundle branch block.  [de-identified] : 7/29/2022:

## 2022-08-11 NOTE — HISTORY OF PRESENT ILLNESS
[FreeTextEntry1] : 46 year old woman with a history of rheumatic heart disease S/P MVR then reop bio MVR/AVR in 2011 presents for routine follow up. Echocardiogram done today with normal functioning bioprosthesis and normal LV function. She states she is feeling well. . Her menses has returned to normal after D&C and hysteroscopy and she is no longer anemic.  An endometrial polyp was removed.  Recently, she notes that she can feel easily anxious but denies chest pain, dyspnea or palpitations.  She notes that her rings can get tight but no edema, orthopnea or PND.  She had an echo today with normal LV function and normal bioprosthetic function of the mitral and aortic valves.\par She thinks she may be perimenopausal.  She has been getting hot flashes as well.

## 2022-08-11 NOTE — REVIEW OF SYSTEMS
[Easy Bruising] : a tendency for easy bruising [Negative] : Psychiatric [SOB] : no shortness of breath [Dyspnea on exertion] : not dyspnea during exertion [Chest Discomfort] : no chest discomfort [Lower Ext Edema] : no extremity edema [Leg Claudication] : no intermittent leg claudication [Palpitations] : no palpitations [Orthopnea] : no orthopnea [PND] : no PND [Syncope] : no syncope [FreeTextEntry4] : seasonal allergies [FreeTextEntry8] : she has not had a period in 3 months - to see her gynecologist soon.  [FreeTextEntry9] : low back pain

## 2022-08-11 NOTE — PHYSICAL EXAM
[Well Developed] : well developed [Well Nourished] : well nourished [No Acute Distress] : no acute distress [Normal Conjunctiva] : normal conjunctiva [Normal Venous Pressure] : normal venous pressure [No Carotid Bruit] : no carotid bruit [Normal S1, S2] : normal S1, S2 [No Rub] : no rub [No Gallop] : no gallop [Clear Lung Fields] : clear lung fields [Good Air Entry] : good air entry [No Respiratory Distress] : no respiratory distress  [Soft] : abdomen soft [Non Tender] : non-tender [Normal Bowel Sounds] : normal bowel sounds [Normal Gait] : normal gait [No Edema] : no edema [No Cyanosis] : no cyanosis [No Rash] : no rash [No Skin Lesions] : no skin lesions [Moves all extremities] : moves all extremities [No Focal Deficits] : no focal deficits [Normal Speech] : normal speech [Alert and Oriented] : alert and oriented [Normal memory] : normal memory [de-identified] : 2/6 soft systolic murmur [de-identified] : mild varicose veins

## 2023-01-17 ENCOUNTER — NON-APPOINTMENT (OUTPATIENT)
Age: 48
End: 2023-01-17

## 2023-01-17 ENCOUNTER — APPOINTMENT (OUTPATIENT)
Dept: ELECTROPHYSIOLOGY | Facility: CLINIC | Age: 48
End: 2023-01-17
Payer: MEDICAID

## 2023-01-17 VITALS
WEIGHT: 168 LBS | HEIGHT: 66 IN | SYSTOLIC BLOOD PRESSURE: 140 MMHG | BODY MASS INDEX: 27 KG/M2 | OXYGEN SATURATION: 99 % | HEART RATE: 94 BPM | DIASTOLIC BLOOD PRESSURE: 82 MMHG

## 2023-01-17 PROCEDURE — 99204 OFFICE O/P NEW MOD 45 MIN: CPT | Mod: 25

## 2023-01-17 PROCEDURE — 93000 ELECTROCARDIOGRAM COMPLETE: CPT

## 2023-01-17 NOTE — DISCUSSION/SUMMARY
[EKG obtained to assist in diagnosis and management of assessed problem(s)] : EKG obtained to assist in diagnosis and management of assessed problem(s) [FreeTextEntry1] : IMPRESSION:\par \par 1. Persistent afib: EKG performed today to assess for conduction disease and reveals  AFIB 76 bpm. On Eliquis started on 12/30/23. Not on Rate control now. Patient will be scheduled for DELBERT DCCV.  Other options include antiarrhythmic medications or ablation.  Also reviewed guidelines with regard to anticoagulation and DOAC is indicated in patients with bioprosthetic valve replacement and AF. \par \par \par \par

## 2023-01-17 NOTE — HISTORY OF PRESENT ILLNESS
[FreeTextEntry1] : \par Ms Hernandez is a 47 year old woman with history of rheumatic heart disease S/P MVR then reop bio MVR/AVR in 2011 who presents today for initial visit. Seen by cardiologist , noted on EKG, new onset Afib. On 12/30/22 ekg showed afib with ventricular rate  69bpm w/ RBBB. Other than having mild sob, happens very rarely, she remained asymptomatic. Did not have Holter yet. Started On Eliquis 5mg BID. Feeling well now. Denies any chest pain, palpitations, syncope/ near syncope.\par \par \par \par \par \par \par

## 2023-01-20 ENCOUNTER — OUTPATIENT (OUTPATIENT)
Dept: OUTPATIENT SERVICES | Facility: HOSPITAL | Age: 48
LOS: 1 days | End: 2023-01-20

## 2023-01-20 VITALS
OXYGEN SATURATION: 98 % | DIASTOLIC BLOOD PRESSURE: 68 MMHG | RESPIRATION RATE: 16 BRPM | SYSTOLIC BLOOD PRESSURE: 114 MMHG | HEART RATE: 71 BPM | HEIGHT: 65 IN | WEIGHT: 167.99 LBS | TEMPERATURE: 98 F

## 2023-01-20 DIAGNOSIS — Z98.51 TUBAL LIGATION STATUS: Chronic | ICD-10-CM

## 2023-01-20 DIAGNOSIS — I48.91 UNSPECIFIED ATRIAL FIBRILLATION: ICD-10-CM

## 2023-01-20 DIAGNOSIS — Z98.890 OTHER SPECIFIED POSTPROCEDURAL STATES: Chronic | ICD-10-CM

## 2023-01-20 DIAGNOSIS — Z95.2 PRESENCE OF PROSTHETIC HEART VALVE: Chronic | ICD-10-CM

## 2023-01-20 LAB
ANION GAP SERPL CALC-SCNC: 8 MMOL/L — SIGNIFICANT CHANGE UP (ref 7–14)
BLD GP AB SCN SERPL QL: NEGATIVE — SIGNIFICANT CHANGE UP
BUN SERPL-MCNC: 13 MG/DL — SIGNIFICANT CHANGE UP (ref 7–23)
CALCIUM SERPL-MCNC: 9.8 MG/DL — SIGNIFICANT CHANGE UP (ref 8.4–10.5)
CHLORIDE SERPL-SCNC: 104 MMOL/L — SIGNIFICANT CHANGE UP (ref 98–107)
CO2 SERPL-SCNC: 27 MMOL/L — SIGNIFICANT CHANGE UP (ref 22–31)
CREAT SERPL-MCNC: 0.84 MG/DL — SIGNIFICANT CHANGE UP (ref 0.5–1.3)
EGFR: 85 ML/MIN/1.73M2 — SIGNIFICANT CHANGE UP
GLUCOSE SERPL-MCNC: 104 MG/DL — HIGH (ref 70–99)
HCG SERPL-ACNC: <5 MIU/ML — SIGNIFICANT CHANGE UP
HCT VFR BLD CALC: 45.3 % — HIGH (ref 34.5–45)
HGB BLD-MCNC: 14.4 G/DL — SIGNIFICANT CHANGE UP (ref 11.5–15.5)
MCHC RBC-ENTMCNC: 26.9 PG — LOW (ref 27–34)
MCHC RBC-ENTMCNC: 31.8 GM/DL — LOW (ref 32–36)
MCV RBC AUTO: 84.5 FL — SIGNIFICANT CHANGE UP (ref 80–100)
NRBC # BLD: 0 /100 WBCS — SIGNIFICANT CHANGE UP (ref 0–0)
NRBC # FLD: 0 K/UL — SIGNIFICANT CHANGE UP (ref 0–0)
PLATELET # BLD AUTO: 277 K/UL — SIGNIFICANT CHANGE UP (ref 150–400)
POTASSIUM SERPL-MCNC: 3.9 MMOL/L — SIGNIFICANT CHANGE UP (ref 3.5–5.3)
POTASSIUM SERPL-SCNC: 3.9 MMOL/L — SIGNIFICANT CHANGE UP (ref 3.5–5.3)
RBC # BLD: 5.36 M/UL — HIGH (ref 3.8–5.2)
RBC # FLD: 13.6 % — SIGNIFICANT CHANGE UP (ref 10.3–14.5)
RH IG SCN BLD-IMP: POSITIVE — SIGNIFICANT CHANGE UP
SODIUM SERPL-SCNC: 139 MMOL/L — SIGNIFICANT CHANGE UP (ref 135–145)
WBC # BLD: 6.54 K/UL — SIGNIFICANT CHANGE UP (ref 3.8–10.5)
WBC # FLD AUTO: 6.54 K/UL — SIGNIFICANT CHANGE UP (ref 3.8–10.5)

## 2023-01-20 RX ORDER — IBUPROFEN 200 MG
3 TABLET ORAL
Qty: 0 | Refills: 0 | DISCHARGE

## 2023-01-20 RX ORDER — FLUTICASONE PROPIONATE 50 MCG
1 SPRAY, SUSPENSION NASAL
Qty: 0 | Refills: 0 | DISCHARGE

## 2023-01-20 RX ORDER — FERROUS SULFATE 325(65) MG
1 TABLET ORAL
Qty: 0 | Refills: 0 | DISCHARGE

## 2023-01-20 RX ORDER — ASPIRIN/CALCIUM CARB/MAGNESIUM 324 MG
1 TABLET ORAL
Qty: 0 | Refills: 0 | DISCHARGE
End: 2020-01-23

## 2023-01-20 RX ORDER — APIXABAN 2.5 MG/1
1 TABLET, FILM COATED ORAL
Qty: 0 | Refills: 0 | DISCHARGE

## 2023-01-20 RX ORDER — FEXOFENADINE HCL 30 MG
1 TABLET ORAL
Qty: 0 | Refills: 0 | DISCHARGE

## 2023-01-20 RX ORDER — PREGABALIN 225 MG/1
1 CAPSULE ORAL
Qty: 0 | Refills: 0 | DISCHARGE

## 2023-01-20 RX ORDER — ACETAMINOPHEN 500 MG
3 TABLET ORAL
Qty: 0 | Refills: 0 | DISCHARGE

## 2023-01-20 NOTE — H&P PST ADULT - NSICDXPASTMEDICALHX_GEN_ALL_CORE_FT
PAST MEDICAL HISTORY:  Atrial fibrillation     H/O menorrhagia     H/O rheumatic heart disease     History of right bundle branch block (RBBB)

## 2023-01-20 NOTE — H&P PST ADULT - PROBLEM SELECTOR PLAN 1
48 y/o female with h/o rheumatic heart disease and had mitral and aortic valve replacement in 2011 presents to PST w/ a preop dx of atrial fibrillation diagnosed in Dec 2022. Pt presents for preop to have DELBERT and DCCV on 2/6/23.  labs pending, ekg done on 1/17/23 in chart.  Preop instructions provided to pt.  Famotidine  provided to pt with instructions.  Pt advised to obtain COVID pcr 3 days prior to DOS.   Cardiac instructions provided by Cardiology staff

## 2023-01-20 NOTE — H&P PST ADULT - ASSESSMENT
46 y/o female with h/o rheumatic heart disease and had mitral and aortic valve replacement in 2011 presents to PST w/ a preop dx of atrial fibrillation diagnosed in Dec 2022. Pt presents for preop to have DELBERT and DCCV on 2/6/23.

## 2023-01-20 NOTE — H&P PST ADULT - NSANTHOSAYNRD_GEN_A_CORE
never tested/No. SYDNIE screening performed.  STOP BANG Legend: 0-2 = LOW Risk; 3-4 = INTERMEDIATE Risk; 5-8 = HIGH Risk

## 2023-01-20 NOTE — H&P PST ADULT - CARDIOVASCULAR COMMENTS
h/o Atrial fibrillation, h/o rheumatic heart disease and had aortic and mitral valve replacement in 2011

## 2023-01-20 NOTE — H&P PST ADULT - HISTORY OF PRESENT ILLNESS
48 y/o female with h/o rheumatic heart disease and had mitral and aortic valve replacement in 2011 presents to PST w/ a preop dx of atrial fibrillation diagnosed in Dec 2022. Pt presents for preop to have DELBERT and DCCV on 2/6/23.

## 2023-01-20 NOTE — H&P PST ADULT - NSICDXPASTSURGICALHX_GEN_ALL_CORE_FT
PAST SURGICAL HISTORY:  H/O aortic valve replacement     H/O mitral valve replacement     H/O tubal ligation     History of D&C

## 2023-02-04 LAB — SARS-COV-2 RNA SPEC QL NAA+PROBE: SIGNIFICANT CHANGE UP

## 2023-02-06 ENCOUNTER — OUTPATIENT (OUTPATIENT)
Dept: OUTPATIENT SERVICES | Facility: HOSPITAL | Age: 48
LOS: 1 days | Discharge: HOME | End: 2023-02-06
Payer: MEDICAID

## 2023-02-06 DIAGNOSIS — Z98.51 TUBAL LIGATION STATUS: Chronic | ICD-10-CM

## 2023-02-06 DIAGNOSIS — Z98.890 OTHER SPECIFIED POSTPROCEDURAL STATES: Chronic | ICD-10-CM

## 2023-02-06 DIAGNOSIS — I48.91 UNSPECIFIED ATRIAL FIBRILLATION: ICD-10-CM

## 2023-02-06 DIAGNOSIS — Z95.2 PRESENCE OF PROSTHETIC HEART VALVE: Chronic | ICD-10-CM

## 2023-02-06 LAB — HCG UR QL: NEGATIVE — SIGNIFICANT CHANGE UP

## 2023-02-06 PROCEDURE — 93306 TTE W/DOPPLER COMPLETE: CPT | Mod: 26

## 2023-02-06 PROCEDURE — 92960 CARDIOVERSION ELECTRIC EXT: CPT

## 2023-02-06 PROCEDURE — 76376 3D RENDER W/INTRP POSTPROCES: CPT | Mod: 26

## 2023-02-06 PROCEDURE — 93312 ECHO TRANSESOPHAGEAL: CPT | Mod: 26

## 2023-02-06 PROCEDURE — 93010 ELECTROCARDIOGRAM REPORT: CPT | Mod: 76

## 2023-02-06 RX ORDER — SODIUM CHLORIDE 9 MG/ML
3 INJECTION INTRAMUSCULAR; INTRAVENOUS; SUBCUTANEOUS EVERY 8 HOURS
Refills: 0 | Status: DISCONTINUED | OUTPATIENT
Start: 2023-02-06 | End: 2023-02-20

## 2023-02-06 NOTE — CHART NOTE - NSCHARTNOTEFT_GEN_A_CORE
Interventional Recovery Suite PA Note    Patient is a 46 y/o female with h/o rheumatic heart disease, s/p mitral and aortic valve replacement in 2011, A Fib diagnosed in Dec 2022 arrived for elective DELBERT/cardioversion.   The process of the procedures along with the risk and benefits for the procedure were explained in detail which included but not limited to stroke, aspiration, esophageal injury, complication from anesthesia.  Patient expressed understanding an all questions were answered.   Patient denies chest pain, SOB, palpitations, dizziness, presyncope, syncope,  headache, visual disturbances, PE, DVT, abdominal pain, N/V/D/C, hematochezia, melena, dysuria, hematuria, fever, chills.   PST H&P appreciated;  Patient seen and examined today; physical exam remarkable for irregular heart rate.  Medication reconciliation reviewed, this morning patient took Eliquis 5mg at 7 am.   The patient denies chest pain, SOB, palpitations, dizziness, presyncope, syncope,  headache, visual disturbances, PE, DVT, SYDNIE, abdominal pain, N/V/D/C, hematochezia, melena, dysuria, hematuria, fever, chills, ulcers. Interventional Recovery Suite PA Note    Patient is a 48 y/o female with h/o rheumatic heart disease, s/p mitral and aortic valve replacement in 2011, A Fib diagnosed in Dec 2022 arrived for elective DELBERT/cardioversion.   The process of the procedures along with the risk and benefits for the procedure were explained in detail which included but not limited to stroke, aspiration, esophageal injury, complication from anesthesia.  Patient expressed understanding an all questions were answered.   Patient denies chest pain, SOB, palpitations, dizziness, presyncope, syncope,  headache, visual disturbances, PE, DVT, abdominal pain, N/V/D/C, hematochezia, melena, dysuria, hematuria, fever, chills.   PST H&P appreciated;  Patient seen and examined today; physical exam remarkable for irregular heart rate.  Medication reconciliation reviewed, this morning patient took Eliquis 5mg at 7 am.   The patient denies chest pain, SOB, palpitations, dizziness, presyncope, syncope,  headache, visual disturbances, PE, DVT, SYDNIE, abdominal pain, N/V/D/C, hematochezia, melena, dysuria, hematuria, fever, chills, ulcers.    NPO Date and Time: 2/5/23 at 7 pm  Mallampati: I  Anticoagulation Date and Time?  2/6/23 at 7 am  Previous Endoscopy?  NO  Hx of CVA?  NO  Sleep Apnea?  NO  Dentures? NO  Loose Teeth? NO      Patient Denies:    Prior difficult intubation or airway problems  Odynophagia  Dysphagia  Esophageal stricture  Esophageal tumor  Esophageal varices  Esophageal perforation/laceration  Esophageal diverticulum  Large diaphragmatic Hernia  Active or recent upper gastrointestinal (GI) bleed  History of GI surgery  History of Esophageal surgery  History of Payton's esophagus  Tenuous cardiorespiratory status  Cervical spine arthritis with reduced range of motion or atlantoaxial joint disease. History of radiation to head, neck, or mediastinum  Severe thrombocytopenia  Obstructive sleep apnea Interventional Recovery Suite PA Note    Patient is a 48 y/o female with h/o rheumatic heart disease, s/p mitral and aortic valve replacement in 2011, A Fib diagnosed in Dec 2022 arrived for elective DELBERT/cardioversion.   The process of the procedures along with the risk and benefits for the procedure were explained in detail which included but not limited to stroke, aspiration, esophageal injury, complication from anesthesia.  Patient expressed understanding an all questions were answered.   PST H&P appreciated;  Patient seen and examined today; physical exam remarkable for irregular heart rate.  Medication reconciliation reviewed, this morning patient took Eliquis 5mg at 7 am.   The patient denies chest pain, SOB, palpitations, dizziness, presyncope, syncope,  headache, visual disturbances, PE, DVT, SYDNIE, abdominal pain, N/V/D/C, hematochezia, melena, dysuria, hematuria, fever, chills, ulcers.    NPO Date and Time: 2/5/23 at 7 pm  Mallampati: I  Anticoagulation Date and Time?  2/6/23 at 7 am  Previous Endoscopy?  NO  Hx of CVA?  NO  Sleep Apnea?  NO  Dentures? NO  Loose Teeth? NO      Patient Denies:    Prior difficult intubation or airway problems  Odynophagia  Dysphagia  Esophageal stricture  Esophageal tumor  Esophageal varices  Esophageal perforation/laceration  Esophageal diverticulum  Large diaphragmatic Hernia  Active or recent upper gastrointestinal (GI) bleed  History of GI surgery  History of Esophageal surgery  History of Payton's esophagus  Tenuous cardiorespiratory status  Cervical spine arthritis with reduced range of motion or atlantoaxial joint disease. History of radiation to head, neck, or mediastinum  Severe thrombocytopenia  Obstructive sleep apnea

## 2023-02-27 PROBLEM — I48.91 UNSPECIFIED ATRIAL FIBRILLATION: Chronic | Status: ACTIVE | Noted: 2023-01-20

## 2023-02-27 PROBLEM — Z86.79 PERSONAL HISTORY OF OTHER DISEASES OF THE CIRCULATORY SYSTEM: Chronic | Status: ACTIVE | Noted: 2023-01-20

## 2023-02-27 PROBLEM — Z87.42 PERSONAL HISTORY OF OTHER DISEASES OF THE FEMALE GENITAL TRACT: Chronic | Status: ACTIVE | Noted: 2023-01-20

## 2023-02-28 NOTE — ED PROVIDER NOTE - TIMING
Assessment/Plan: CMP CBC TSH reviewed  Patient will get future blood work done today  Patient will continue with Motrin for migraines  Patient will see nutrition for obesity  Patient use ice and Motrin for chondromalacia patella/patellar tendinitis  To consider physical therapy in the future for shoulder pain and knee pain  Diagnoses and all orders for this visit:    Acute pain of left knee  -     XR knee 3 vw left non injury; Future    Migraine without aura and without status migrainosus, not intractable  -     ibuprofen (MOTRIN) 600 mg tablet; Take 1 tablet (600 mg total) by mouth every 6 (six) hours as needed for mild pain    Obesity (BMI 30-39 9)  -     Ambulatory Referral to Nutrition Services; Future    Chronic left shoulder pain            Subjective:        Patient ID: Rubens Choudhury is a 23 y o  female  Patient is here with left leg weakness for the past few weeks  Patient's pain is anterior  No direct trauma  No radiating symptoms  The following portions of the patient's history were reviewed and updated as appropriate: allergies, current medications, past family history, past medical history, past social history, past surgical history and problem list       Review of Systems   Constitutional: Negative  HENT: Negative  Eyes: Negative  Respiratory: Negative  Cardiovascular: Negative  Gastrointestinal: Negative  Endocrine: Negative  Genitourinary: Negative  Musculoskeletal: Positive for arthralgias  Skin: Negative  Allergic/Immunologic: Negative  Neurological: Negative  Hematological: Negative  Psychiatric/Behavioral: Negative  Objective:      BMI Counseling: Body mass index is 35 76 kg/m²  The BMI is above normal  Nutrition recommendations include consuming healthier snacks  Exercise recommendations include moderate physical activity 150 minutes/week  Rationale for BMI follow-up plan is due to patient being overweight or obese  /72 (BP Location: Right arm, Patient Position: Sitting, Cuff Size: Large)   Pulse 83   Temp 98 3 °F (36 8 °C) (Temporal)   Ht 5' 4 5" (1 638 m)   Wt 96 kg (211 lb 9 6 oz)   SpO2 98%   BMI 35 76 kg/m²          Physical Exam  Vitals and nursing note reviewed  Constitutional:       General: She is not in acute distress  Appearance: Normal appearance  She is not ill-appearing, toxic-appearing or diaphoretic  HENT:      Head: Normocephalic and atraumatic  Right Ear: Tympanic membrane, ear canal and external ear normal  There is no impacted cerumen  Left Ear: Tympanic membrane, ear canal and external ear normal  There is no impacted cerumen  Nose: Nose normal  No congestion or rhinorrhea  Mouth/Throat:      Mouth: Mucous membranes are moist       Pharynx: No oropharyngeal exudate or posterior oropharyngeal erythema  Eyes:      General: No scleral icterus  Right eye: No discharge  Left eye: No discharge  Extraocular Movements: Extraocular movements intact  Conjunctiva/sclera: Conjunctivae normal       Pupils: Pupils are equal, round, and reactive to light  Neck:      Vascular: No carotid bruit  Cardiovascular:      Rate and Rhythm: Normal rate and regular rhythm  Pulses: Normal pulses  Heart sounds: Normal heart sounds  No murmur heard  No friction rub  No gallop  Pulmonary:      Effort: Pulmonary effort is normal  No respiratory distress  Breath sounds: Normal breath sounds  No stridor  No wheezing, rhonchi or rales  Chest:      Chest wall: No tenderness  Musculoskeletal:         General: Tenderness present  No swelling, deformity or signs of injury  Cervical back: Normal range of motion and neck supple  No rigidity  No muscular tenderness  Right lower leg: No edema  Left lower leg: No edema  Comments: Discomfort over patella tendon  No pain over joint line or MCL or LCL    Negative Lachman and Apley test    Lymphadenopathy:      Cervical: No cervical adenopathy  Skin:     General: Skin is warm and dry  Capillary Refill: Capillary refill takes less than 2 seconds  Coloration: Skin is not jaundiced  Findings: No bruising, erythema, lesion or rash  Neurological:      Mental Status: She is alert and oriented to person, place, and time  Mental status is at baseline  Cranial Nerves: No cranial nerve deficit  Sensory: No sensory deficit  Motor: No weakness  Coordination: Coordination normal       Gait: Gait normal    Psychiatric:         Mood and Affect: Mood normal          Behavior: Behavior normal          Thought Content:  Thought content normal          Judgment: Judgment normal  sudden onset

## 2023-03-03 ENCOUNTER — APPOINTMENT (OUTPATIENT)
Dept: ELECTROPHYSIOLOGY | Facility: CLINIC | Age: 48
End: 2023-03-03
Payer: MEDICAID

## 2023-03-03 ENCOUNTER — NON-APPOINTMENT (OUTPATIENT)
Age: 48
End: 2023-03-03

## 2023-03-03 VITALS
DIASTOLIC BLOOD PRESSURE: 84 MMHG | HEIGHT: 66 IN | BODY MASS INDEX: 27 KG/M2 | WEIGHT: 168 LBS | OXYGEN SATURATION: 96 % | HEART RATE: 67 BPM | TEMPERATURE: 98.8 F | SYSTOLIC BLOOD PRESSURE: 152 MMHG

## 2023-03-03 VITALS — SYSTOLIC BLOOD PRESSURE: 135 MMHG | DIASTOLIC BLOOD PRESSURE: 86 MMHG

## 2023-03-03 DIAGNOSIS — I48.91 UNSPECIFIED ATRIAL FIBRILLATION: ICD-10-CM

## 2023-03-03 PROCEDURE — 93000 ELECTROCARDIOGRAM COMPLETE: CPT

## 2023-03-03 PROCEDURE — 99214 OFFICE O/P EST MOD 30 MIN: CPT | Mod: 25

## 2023-03-03 NOTE — REASON FOR VISIT
"Chief Complaint  Follow-up (Here for prostate biopsy results)    Prostate cancer    Subjective  Patient is doing fine and no problem with the biopsy        Ramirez Baltazar presents to [facility]  History of Present Illness    69-year-old white male with positive biopsy of prostate carcinoma in several areas of biopsy.  Patient PSA was normal last year but certainly went to 7.  390.  Patient was exposed to agent orange in [place].  Patient has Alessandro score 4+4 left transitional zone.  He has Alessandro score 4+3 and left mid and again 4+3 and left base of the prostate.  He also has 3+3 Bartley score cancer in the right apex.    Objective No acute distress  Vital Signs:   /60   Pulse 80   Temp 97.5 °F (36.4 °C) (Infrared)   Ht 190.5 cm (75\")   Wt 106 kg (234 lb)   BMI 29.25 kg/m²     No Known Allergies   Past medical history:  has a past medical history of Anxiety, Arthritis, Benign prostatic hyperplasia, Elevated PSA, Hypertension, Left shoulder pain, Low back pain, and Multiple bruises.   Past surgical history:  has a past surgical history that includes Cataract extraction (Right); Tonsillectomy; Other surgical history; Total shoulder arthroplasty w/ distal clavicle excision (Left, 06/24/2019); Colonoscopy (2014); and Hand surgery (08/05/2022).  Personal history: family history is not on file.  Social history:  reports that he quit smoking about 26 years ago. His smoking use included cigarettes. He has a 135.00 pack-year smoking history. He has never used smokeless tobacco. He reports current alcohol use. He reports that he does not use drugs.    Review of Systems    No change from before    Physical Exam  Constitutional:       General: He is not in acute distress.     Appearance: Normal appearance. He is normal weight. He is not ill-appearing or toxic-appearing.   Skin:     General: Skin is warm.      Coloration: Skin is not jaundiced.   Neurological:      General: No focal " deficit present.      Mental Status: He is alert and oriented to person, place, and time.      Motor: No weakness.      Gait: Gait normal.   Psychiatric:         Mood and Affect: Mood normal.         Behavior: Behavior normal.         Thought Content: Thought content normal.         Judgment: Judgment normal.        Result Review :                 Assessment and Plan    Diagnoses and all orders for this visit:    1. BPH with obstruction/lower urinary tract symptoms (Primary)  -     POC Urinalysis Dipstick, Automated    2. Prostate cancer (HCC)      Patient has a significant prostate carcinoma.  Personally I would recommend a radical prostatectomy for the patient and will refer him to surgical specialist urologist.  Discussed radiation also which is another avenue.  Patient will be followed by surgical specialist urologists.  Brief Urine Lab Results  (Last result in the past 365 days)      Color   Clarity   Blood   Leuk Est   Nitrite   Protein   CREAT   Urine HCG        09/15/22 1308 Yellow   Clear   Moderate   Negative   Negative   Negative                  Follow Up   No follow-ups on file.  Patient was given instructions and counseling regarding his condition or for health maintenance advice. Please see specific information pulled into the AVS if appropriate.     Sandy Alcaraz MD    [Arrhythmia/ECG Abnorrmalities] : arrhythmia/ECG abnormalities [FreeTextEntry3] : Brad Miranda

## 2023-03-03 NOTE — HISTORY OF PRESENT ILLNESS
[FreeTextEntry1] : \par Ms Hernandez is a 47 year old woman with history of rheumatic heart disease S/P MVR then reop bio MVR/AVR in 2011, persistent Afib s/p DELBERT/DCCV on 2/6/23 with successful conversion to SR who presents today for followup visit post cardioversion. She is doing well after cardioversion. Sometimes she feels mild SOB. ECG performed today revealed atrial fibrillation. Remains on Eliquis. Noticed 1 episode of black stool 5 days ago, went to ER, advIsed to follow up with GI. She is schedule to f/u with pcp next 3/11/23.  She notices her BP is slowly increasing, today her blood pressure was 152/84 mmhg. Not on anti hypertensives. Seeing cardiologist soon. Advised her to check BP at home and keep a BP log.  Denies any chest pain, palpitations, syncope/ near syncope.\par \par \par \par \par \par \par

## 2023-03-03 NOTE — PHYSICAL EXAM
[Normal] : normal S1, S2, no murmur, no rub, no gallop [Well Developed] : well developed [Well Nourished] : well nourished [No Acute Distress] : no acute distress [Normal Conjunctiva] : normal conjunctiva [Normal Venous Pressure] : normal venous pressure [No Carotid Bruit] : no carotid bruit [Normal S1, S2] : normal S1, S2 [No Murmur] : no murmur [No Rub] : no rub [No Gallop] : no gallop [Clear Lung Fields] : clear lung fields [Good Air Entry] : good air entry [No Respiratory Distress] : no respiratory distress  [Soft] : abdomen soft [Non Tender] : non-tender [No Masses/organomegaly] : no masses/organomegaly [Normal Bowel Sounds] : normal bowel sounds [Normal Gait] : normal gait [No Edema] : no edema [No Cyanosis] : no cyanosis [No Clubbing] : no clubbing [No Varicosities] : no varicosities [No Rash] : no rash [No Skin Lesions] : no skin lesions [Moves all extremities] : moves all extremities [No Focal Deficits] : no focal deficits [Normal Speech] : normal speech [Alert and Oriented] : alert and oriented [Normal memory] : normal memory

## 2023-03-03 NOTE — DISCUSSION/SUMMARY
[EKG obtained to assist in diagnosis and management of assessed problem(s)] : EKG obtained to assist in diagnosis and management of assessed problem(s) [FreeTextEntry1] : IMPRESSION:\par \par 1. Persistent afib: EKG performed today to assess for conduction disease and reveals  AFIB 63 bpm. Echo on 2/6/23 shows LVEF 67%, with Moderate LA enlargement 42cc/m2. On Eliquis started on 12/30/23. Not on Rate control now. Patient will be scheduled for Afib Ablation.  Other options include antiarrhythmic medications, rate control with anticoagulation or ablation.  Also reviewed guidelines with regard to anticoagulation and DOAC is indicated in patients with bioprosthetic valve replacement and AF. \par \par Plan Afib Ablation\par \par \par \par

## 2023-03-10 ENCOUNTER — NON-APPOINTMENT (OUTPATIENT)
Age: 48
End: 2023-03-10

## 2023-03-10 ENCOUNTER — APPOINTMENT (OUTPATIENT)
Dept: CARDIOLOGY | Facility: CLINIC | Age: 48
End: 2023-03-10
Payer: MEDICAID

## 2023-03-10 VITALS
OXYGEN SATURATION: 98 % | HEART RATE: 66 BPM | HEIGHT: 66 IN | BODY MASS INDEX: 27.16 KG/M2 | WEIGHT: 169 LBS | DIASTOLIC BLOOD PRESSURE: 80 MMHG | SYSTOLIC BLOOD PRESSURE: 128 MMHG | TEMPERATURE: 98 F

## 2023-03-10 DIAGNOSIS — R42 DIZZINESS AND GIDDINESS: ICD-10-CM

## 2023-03-10 PROCEDURE — 93000 ELECTROCARDIOGRAM COMPLETE: CPT

## 2023-03-10 PROCEDURE — 99214 OFFICE O/P EST MOD 30 MIN: CPT | Mod: 25

## 2023-04-06 ENCOUNTER — OUTPATIENT (OUTPATIENT)
Dept: OUTPATIENT SERVICES | Facility: HOSPITAL | Age: 48
LOS: 1 days | End: 2023-04-06

## 2023-04-06 VITALS
RESPIRATION RATE: 18 BRPM | DIASTOLIC BLOOD PRESSURE: 70 MMHG | OXYGEN SATURATION: 98 % | HEART RATE: 70 BPM | TEMPERATURE: 98 F | HEIGHT: 65 IN | SYSTOLIC BLOOD PRESSURE: 110 MMHG | WEIGHT: 171.08 LBS

## 2023-04-06 DIAGNOSIS — Z95.2 PRESENCE OF PROSTHETIC HEART VALVE: Chronic | ICD-10-CM

## 2023-04-06 DIAGNOSIS — Z98.890 OTHER SPECIFIED POSTPROCEDURAL STATES: Chronic | ICD-10-CM

## 2023-04-06 DIAGNOSIS — I48.91 UNSPECIFIED ATRIAL FIBRILLATION: ICD-10-CM

## 2023-04-06 DIAGNOSIS — Z98.51 TUBAL LIGATION STATUS: Chronic | ICD-10-CM

## 2023-04-06 DIAGNOSIS — I48.91 UNSPECIFIED ATRIAL FIBRILLATION: Chronic | ICD-10-CM

## 2023-04-06 LAB
ALBUMIN SERPL ELPH-MCNC: 4.5 G/DL — SIGNIFICANT CHANGE UP (ref 3.3–5)
ALP SERPL-CCNC: 104 U/L — SIGNIFICANT CHANGE UP (ref 40–120)
ALT FLD-CCNC: 22 U/L — SIGNIFICANT CHANGE UP (ref 4–33)
ANION GAP SERPL CALC-SCNC: 14 MMOL/L — SIGNIFICANT CHANGE UP (ref 7–14)
AST SERPL-CCNC: 27 U/L — SIGNIFICANT CHANGE UP (ref 4–32)
BILIRUB SERPL-MCNC: 0.5 MG/DL — SIGNIFICANT CHANGE UP (ref 0.2–1.2)
BLD GP AB SCN SERPL QL: NEGATIVE — SIGNIFICANT CHANGE UP
BUN SERPL-MCNC: 21 MG/DL — SIGNIFICANT CHANGE UP (ref 7–23)
CALCIUM SERPL-MCNC: 10.1 MG/DL — SIGNIFICANT CHANGE UP (ref 8.4–10.5)
CHLORIDE SERPL-SCNC: 104 MMOL/L — SIGNIFICANT CHANGE UP (ref 98–107)
CO2 SERPL-SCNC: 20 MMOL/L — LOW (ref 22–31)
CREAT SERPL-MCNC: 0.76 MG/DL — SIGNIFICANT CHANGE UP (ref 0.5–1.3)
EGFR: 96 ML/MIN/1.73M2 — SIGNIFICANT CHANGE UP
GLUCOSE SERPL-MCNC: 92 MG/DL — SIGNIFICANT CHANGE UP (ref 70–99)
HCT VFR BLD CALC: 44.7 % — SIGNIFICANT CHANGE UP (ref 34.5–45)
HGB BLD-MCNC: 14.4 G/DL — SIGNIFICANT CHANGE UP (ref 11.5–15.5)
MCHC RBC-ENTMCNC: 28.3 PG — SIGNIFICANT CHANGE UP (ref 27–34)
MCHC RBC-ENTMCNC: 32.2 GM/DL — SIGNIFICANT CHANGE UP (ref 32–36)
MCV RBC AUTO: 88 FL — SIGNIFICANT CHANGE UP (ref 80–100)
NRBC # BLD: 0 /100 WBCS — SIGNIFICANT CHANGE UP (ref 0–0)
NRBC # FLD: 0 K/UL — SIGNIFICANT CHANGE UP (ref 0–0)
PLATELET # BLD AUTO: 277 K/UL — SIGNIFICANT CHANGE UP (ref 150–400)
POTASSIUM SERPL-MCNC: 4 MMOL/L — SIGNIFICANT CHANGE UP (ref 3.5–5.3)
POTASSIUM SERPL-SCNC: 4 MMOL/L — SIGNIFICANT CHANGE UP (ref 3.5–5.3)
PROT SERPL-MCNC: 7.1 G/DL — SIGNIFICANT CHANGE UP (ref 6–8.3)
RBC # BLD: 5.08 M/UL — SIGNIFICANT CHANGE UP (ref 3.8–5.2)
RBC # FLD: 14.1 % — SIGNIFICANT CHANGE UP (ref 10.3–14.5)
RH IG SCN BLD-IMP: POSITIVE — SIGNIFICANT CHANGE UP
SODIUM SERPL-SCNC: 138 MMOL/L — SIGNIFICANT CHANGE UP (ref 135–145)
WBC # BLD: 7.85 K/UL — SIGNIFICANT CHANGE UP (ref 3.8–10.5)
WBC # FLD AUTO: 7.85 K/UL — SIGNIFICANT CHANGE UP (ref 3.8–10.5)

## 2023-04-06 NOTE — H&P PST ADULT - NSICDXPASTSURGICALHX_GEN_ALL_CORE_FT
PAST SURGICAL HISTORY:  Atrial fibrillation status post cardioversion     H/O aortic valve replacement     H/O mitral valve replacement     H/O tubal ligation     History of D&C     S/P transesophageal echocardiogram (DELBERT)

## 2023-04-06 NOTE — H&P PST ADULT - NEGATIVE GENERAL GENITOURINARY SYMPTOMS
no hematuria/no renal colic/no flank pain L/no flank pain R/no urine discoloration/no bladder infections/no incontinence/no dysuria/no urinary hesitancy/normal urinary frequency/no nocturia/normal libido

## 2023-04-06 NOTE — H&P PST ADULT - ALLERGIC/IMMUNOLOGIC
[Dear  ___] : Dear  [unfilled], [Consult Letter:] : I had the pleasure of evaluating your patient, [unfilled]. [Sincerely,] : Sincerely, [DrPepe  ___] : Dr. CANELA [DrPepe ___] : Dr. CANELA details…

## 2023-04-06 NOTE — H&P PST ADULT - CARDIOVASCULAR COMMENTS
walk at work, walk 20 min , does own house chores walk at work, walk 20 min outdoors occasionally, does own house chores

## 2023-04-06 NOTE — H&P PST ADULT - NEGATIVE ENMT SYMPTOMS
Patient denies loose teeth or dentures/no ear pain/no nasal congestion/no nose bleeds Patient denies loose teeth or dentures/no ear pain/no tinnitus/no vertigo/no sinus symptoms/no nasal congestion/no nose bleeds/no abnormal taste sensation/no dry mouth/no throat pain/no dysphagia

## 2023-04-06 NOTE — H&P PST ADULT - HISTORY OF PRESENT ILLNESS
48 y/o F with h/o AFIB. Dx on 12/2022 presents for pre op eval in preparation for atrial fibrillation ablation w/ [employer]  Admission

## 2023-04-06 NOTE — H&P PST ADULT - NEGATIVE IMMUNOLOGICAL SYMPTOMS
no persistent infections/no recurring pneumonia no recurring infections/no persistent infections/no recurring pneumonia

## 2023-04-06 NOTE — H&P PST ADULT - NEGATIVE PSYCHIATRIC SYMPTOMS
no suicidal ideation/no depression/no anxiety/no insomnia no suicidal ideation/no depression/no anxiety/no insomnia/no memory loss/no paranoia

## 2023-04-06 NOTE — H&P PST ADULT - NEGATIVE GENERAL SYMPTOMS
no fever/no chills/no sweating no fever/no chills/no sweating/no weight loss/no weight gain/no malaise/no fatigue

## 2023-04-06 NOTE — H&P PST ADULT - PROBLEM SELECTOR PLAN 1
Schedule for atrial fibrillation ablation w / [employer] tentatively on 04/20/2023. Pre op instructions, chlorhexidine gluconate soap given and explained. Pt verbalized understanding.

## 2023-04-16 ENCOUNTER — NON-APPOINTMENT (OUTPATIENT)
Age: 48
End: 2023-04-16

## 2023-04-19 ENCOUNTER — NON-APPOINTMENT (OUTPATIENT)
Age: 48
End: 2023-04-19

## 2023-04-20 ENCOUNTER — NON-APPOINTMENT (OUTPATIENT)
Age: 48
End: 2023-04-20

## 2023-04-20 ENCOUNTER — OUTPATIENT (OUTPATIENT)
Dept: OUTPATIENT SERVICES | Facility: HOSPITAL | Age: 48
LOS: 1 days | Discharge: HOME | End: 2023-04-20
Payer: MEDICAID

## 2023-04-20 DIAGNOSIS — Z98.51 TUBAL LIGATION STATUS: Chronic | ICD-10-CM

## 2023-04-20 DIAGNOSIS — Z95.2 PRESENCE OF PROSTHETIC HEART VALVE: Chronic | ICD-10-CM

## 2023-04-20 DIAGNOSIS — Z98.890 OTHER SPECIFIED POSTPROCEDURAL STATES: Chronic | ICD-10-CM

## 2023-04-20 DIAGNOSIS — I48.91 UNSPECIFIED ATRIAL FIBRILLATION: Chronic | ICD-10-CM

## 2023-04-20 DIAGNOSIS — I48.91 UNSPECIFIED ATRIAL FIBRILLATION: ICD-10-CM

## 2023-04-20 LAB — HCG UR QL: NEGATIVE — SIGNIFICANT CHANGE UP

## 2023-04-20 PROCEDURE — 93656 COMPRE EP EVAL ABLTJ ATR FIB: CPT

## 2023-04-20 PROCEDURE — 93010 ELECTROCARDIOGRAM REPORT: CPT

## 2023-04-20 PROCEDURE — 93623 PRGRMD STIMJ&PACG IV RX NFS: CPT | Mod: 26

## 2023-04-20 RX ORDER — PANTOPRAZOLE SODIUM 20 MG/1
1 TABLET, DELAYED RELEASE ORAL
Qty: 30 | Refills: 0
Start: 2023-04-20 | End: 2023-05-19

## 2023-04-20 RX ORDER — SODIUM CHLORIDE 9 MG/ML
3 INJECTION INTRAMUSCULAR; INTRAVENOUS; SUBCUTANEOUS EVERY 8 HOURS
Refills: 0 | Status: DISCONTINUED | OUTPATIENT
Start: 2023-04-20 | End: 2023-05-04

## 2023-04-20 NOTE — CHART NOTE - NSCHARTNOTEFT_GEN_A_CORE
The patient presents today for elective A.Fib. Ablation.  see hard copy of H&P from [employer] and PST in patient's chart.  She has h/o MVR in 2000 and AVR in 2000 and 2011, both tissue valves.  The patient denies chest pain, palpitations, presyncope, syncope,  headache, visual disturbances, CVA, PE, DVT, SYDNIE, abdominal pain, N/V/D/C, hematochezia, melena, dysuria, hematuria, fever, chills. She admits occasional SOB and dizziness during A.Fib.  Medications reviewed.  Last dose of Eliquis 5mg taken on 4/19/23 at 20:00.  LMP 3/2023, patient denies being pregnant.

## 2023-04-20 NOTE — CHART NOTE - NSCHARTNOTEFT_GEN_A_CORE
pt. s/p Afib ablation    Right groin was covered with a  clear with a gauge and clear Tegaderm. It was clean, dry, and intact. No bleeding, drainage hematoma, ecchymosis, or bruit appreciated.    Pulses: +2DP/PT    Plan:  Post-op ablation instruction has been verbally explained and giv5/26 at 9am with Dr. Maher in the EP Clinic ( 4th floor Oncology building [facility] [address], [address], [place], [place], [address] [phone] )    Remove the bandage after 24-48 hours  No scrubbing the incision site for 7 days   No lifting more than 5lb or exertional exercising such as jogging, running, bike riding for 7 days  No swimming pool, Jacuzzi, or bath for 5 days.   Patient can shower 24 hours after procedure . Pat the area dry  Pt was instructed to call [phone] if the following occurs:      - fever with temperature > 100.6      - swelling, drainage or bleeding at the site incision       - chest pain, SOB, n/v

## 2023-05-26 ENCOUNTER — APPOINTMENT (OUTPATIENT)
Dept: ELECTROPHYSIOLOGY | Facility: CLINIC | Age: 48
End: 2023-05-26

## 2023-05-26 ENCOUNTER — NON-APPOINTMENT (OUTPATIENT)
Age: 48
End: 2023-05-26

## 2023-05-26 ENCOUNTER — APPOINTMENT (OUTPATIENT)
Dept: ELECTROPHYSIOLOGY | Facility: CLINIC | Age: 48
End: 2023-05-26
Payer: MEDICAID

## 2023-05-26 VITALS
HEART RATE: 90 BPM | SYSTOLIC BLOOD PRESSURE: 107 MMHG | OXYGEN SATURATION: 98 % | WEIGHT: 169 LBS | DIASTOLIC BLOOD PRESSURE: 75 MMHG | TEMPERATURE: 98 F | HEIGHT: 66 IN | BODY MASS INDEX: 27.16 KG/M2

## 2023-05-26 PROCEDURE — 93000 ELECTROCARDIOGRAM COMPLETE: CPT

## 2023-05-26 PROCEDURE — 99213 OFFICE O/P EST LOW 20 MIN: CPT | Mod: 25

## 2023-05-26 NOTE — HISTORY OF PRESENT ILLNESS
[FreeTextEntry1] : Geneva Hernandez is a 48y/o woman with Hx of rheumatic heart disease S/P MVR then reop bio MVR/AVR in 2011 and persistent Afib s/p DELBERT/DCCV on 2/6/23 and now s/p PVI ablation on 4/20/2023 who presents today for routine f/u post ablation. Admits doing well post procedure. Was feeling SOB when in afib and now all symptoms have resolved. Did have brief palpitations post ablation, possible apc's, but no sustained symptoms and palpitations have now resolved as well. Remains on Eliquis without s/s of bleeding. Right groin without pain, swelling, or bruising. Denies chest pain, palpitations, SOB, syncope or near syncope.\par \par \par \par \par \par \par \par

## 2023-05-26 NOTE — CARDIOLOGY SUMMARY
[de-identified] : 2/6/2023: Conclusions: \par 1. Bioprosthetic mitral valve replacement, which is well\par seated with normal leaflet motion. Minimal mitral\par regurgitation. Mean transmitral valve gradient equals 4 mm\par Hg, which is probably normal in the setting of a prosthetic\par valve.\par 2. Bioprosthetic aortic valve replacement, which appears\par well seated.  Leaflet motion grossly appears normal. Peak\par transaortic valve gradient equals 22 mm Hg, mean\par transaortic valve gradient equals 12 mm Hg, which is\par probably normal in the presence of a prosthetic valve.\par Minimal aortic regurgitation. \par 3. Normal aortic root.  Mild non-mobile atherosclerotic\par plaque in the aortic arch and descending thoracic aorta.\par 4. Moderately dilated left atrium.  LA volume index = 42\par cc/m2.  No left atrium or left atrial appendage thrombus.\par 5. Normal left ventricular internal dimensions and wall\par thicknesses.\par 6. Normal left ventricular systolic function. No segmental\par wall motion abnormalities.\par 7. Normal right ventricular size and function.\par 8. Contrast injection demonstrates no evidence of a patent\par foramen ovale.

## 2023-05-26 NOTE — DISCUSSION/SUMMARY
[FreeTextEntry1] : IMPRESSION:\par \par 1. Persistent afib: s/p PVI ablation on 4/20/2023. EKG performed today to assess for conduction disease and reveals NSR. Will resume Eliquis for thromboembolic prophylaxis as prescribed. \par \par Continue f/u with Cardiologist and RTO for f/u in 3 months. [EKG obtained to assist in diagnosis and management of assessed problem(s)] : EKG obtained to assist in diagnosis and management of assessed problem(s)

## 2023-08-04 ENCOUNTER — APPOINTMENT (OUTPATIENT)
Dept: CARDIOLOGY | Facility: CLINIC | Age: 48
End: 2023-08-04

## 2023-08-18 ENCOUNTER — APPOINTMENT (OUTPATIENT)
Dept: CARDIOLOGY | Facility: CLINIC | Age: 48
End: 2023-08-18
Payer: MEDICAID

## 2023-08-18 VITALS
DIASTOLIC BLOOD PRESSURE: 72 MMHG | HEIGHT: 66 IN | SYSTOLIC BLOOD PRESSURE: 116 MMHG | OXYGEN SATURATION: 99 % | TEMPERATURE: 98.4 F | WEIGHT: 172 LBS | HEART RATE: 85 BPM | BODY MASS INDEX: 27.64 KG/M2

## 2023-08-18 PROCEDURE — 99214 OFFICE O/P EST MOD 30 MIN: CPT | Mod: 25

## 2023-08-18 PROCEDURE — 93000 ELECTROCARDIOGRAM COMPLETE: CPT

## 2023-08-18 RX ORDER — ERGOCALCIFEROL 1.25 MG/1
1.25 MG CAPSULE, LIQUID FILLED ORAL
Refills: 0 | Status: ACTIVE | COMMUNITY
Start: 2018-06-15

## 2023-08-25 ENCOUNTER — NON-APPOINTMENT (OUTPATIENT)
Age: 48
End: 2023-08-25

## 2023-08-25 ENCOUNTER — APPOINTMENT (OUTPATIENT)
Dept: ELECTROPHYSIOLOGY | Facility: CLINIC | Age: 48
End: 2023-08-25
Payer: MEDICAID

## 2023-08-25 VITALS
HEIGHT: 66 IN | WEIGHT: 172 LBS | HEART RATE: 85 BPM | SYSTOLIC BLOOD PRESSURE: 117 MMHG | BODY MASS INDEX: 27.64 KG/M2 | OXYGEN SATURATION: 97 % | DIASTOLIC BLOOD PRESSURE: 74 MMHG

## 2023-08-25 DIAGNOSIS — I48.19 OTHER PERSISTENT ATRIAL FIBRILLATION: ICD-10-CM

## 2023-08-25 PROCEDURE — 99213 OFFICE O/P EST LOW 20 MIN: CPT | Mod: 25

## 2023-08-25 PROCEDURE — 93000 ELECTROCARDIOGRAM COMPLETE: CPT

## 2023-08-25 NOTE — HISTORY OF PRESENT ILLNESS
[FreeTextEntry1] : Geneva Hernandez is a 48y/o woman with Hx of rheumatic heart disease S/P MVR then reop bio MVR/AVR in 2011 and persistent Afib s/p DELBERT/DCCV on 2/6/23 and now s/p PVI ablation on 4/20/2023 who presents today for routine f/u post ablation. Admits doing well post procedure. Was feeling SOB when in afib and now all symptoms have resolved. Did have brief palpitations post ablation, possible apc's, but no sustained symptoms and palpitations have now resolved as well. Remains on Eliquis without s/s of bleeding. Denies chest pain, palpitations, SOB, syncope or near syncope. Questioning need for long term use of AC.

## 2023-08-25 NOTE — CARDIOLOGY SUMMARY
[de-identified] : 2/6/2023: Conclusions: \par  1. Bioprosthetic mitral valve replacement, which is well\par  seated with normal leaflet motion. Minimal mitral\par  regurgitation. Mean transmitral valve gradient equals 4 mm\par  Hg, which is probably normal in the setting of a prosthetic\par  valve.\par  2. Bioprosthetic aortic valve replacement, which appears\par  well seated.  Leaflet motion grossly appears normal. Peak\par  transaortic valve gradient equals 22 mm Hg, mean\par  transaortic valve gradient equals 12 mm Hg, which is\par  probably normal in the presence of a prosthetic valve.\par  Minimal aortic regurgitation. \par  3. Normal aortic root.  Mild non-mobile atherosclerotic\par  plaque in the aortic arch and descending thoracic aorta.\par  4. Moderately dilated left atrium.  LA volume index = 42\par  cc/m2.  No left atrium or left atrial appendage thrombus.\par  5. Normal left ventricular internal dimensions and wall\par  thicknesses.\par  6. Normal left ventricular systolic function. No segmental\par  wall motion abnormalities.\par  7. Normal right ventricular size and function.\par  8. Contrast injection demonstrates no evidence of a patent\par  foramen ovale.

## 2023-08-25 NOTE — DISCUSSION/SUMMARY
[EKG obtained to assist in diagnosis and management of assessed problem(s)] : EKG obtained to assist in diagnosis and management of assessed problem(s) [FreeTextEntry1] : IMPRESSION:  1. Persistent afib: s/p PVI ablation on 4/20/2023. EKG performed today to assess for conduction disease and reveals NSR. Concern about long term use of anticoagulation. [id number] score 1 (gender). Was very symptomatic in afib. Can consider [employer] Iwatch vs [employer] for her mobile phone or ILR for long term monitoring of afib and to allow for safe discontinuation of AC in future. Risks, benefits, and alternatives discussed. Patient has a history of bio-MVR/AVR.

## 2023-09-06 DIAGNOSIS — R06.09 OTHER FORMS OF DYSPNEA: ICD-10-CM

## 2023-09-21 ENCOUNTER — OUTPATIENT (OUTPATIENT)
Dept: OUTPATIENT SERVICES | Facility: HOSPITAL | Age: 48
LOS: 1 days | End: 2023-09-21

## 2023-09-21 ENCOUNTER — APPOINTMENT (OUTPATIENT)
Dept: CV DIAGNOSITCS | Facility: HOSPITAL | Age: 48
End: 2023-09-21
Payer: MEDICAID

## 2023-09-21 DIAGNOSIS — I48.91 UNSPECIFIED ATRIAL FIBRILLATION: Chronic | ICD-10-CM

## 2023-09-21 DIAGNOSIS — Z98.51 TUBAL LIGATION STATUS: Chronic | ICD-10-CM

## 2023-09-21 DIAGNOSIS — Z98.890 OTHER SPECIFIED POSTPROCEDURAL STATES: Chronic | ICD-10-CM

## 2023-09-21 DIAGNOSIS — Z95.2 PRESENCE OF PROSTHETIC HEART VALVE: Chronic | ICD-10-CM

## 2023-09-21 DIAGNOSIS — R06.09 OTHER FORMS OF DYSPNEA: ICD-10-CM

## 2023-09-21 PROCEDURE — 93306 TTE W/DOPPLER COMPLETE: CPT | Mod: 26

## 2023-10-09 ENCOUNTER — OUTPATIENT (OUTPATIENT)
Dept: OUTPATIENT SERVICES | Facility: HOSPITAL | Age: 48
LOS: 1 days | End: 2023-10-09
Payer: MEDICAID

## 2023-10-09 DIAGNOSIS — Z95.2 PRESENCE OF PROSTHETIC HEART VALVE: ICD-10-CM

## 2023-10-09 DIAGNOSIS — Z95.2 PRESENCE OF PROSTHETIC HEART VALVE: Chronic | ICD-10-CM

## 2023-10-09 DIAGNOSIS — I48.91 UNSPECIFIED ATRIAL FIBRILLATION: Chronic | ICD-10-CM

## 2023-10-09 DIAGNOSIS — Z98.890 OTHER SPECIFIED POSTPROCEDURAL STATES: Chronic | ICD-10-CM

## 2023-10-09 DIAGNOSIS — Z98.51 TUBAL LIGATION STATUS: Chronic | ICD-10-CM

## 2023-10-09 LAB
ANION GAP SERPL CALC-SCNC: 9 MMOL/L — SIGNIFICANT CHANGE UP (ref 7–14)
BUN SERPL-MCNC: 11 MG/DL — SIGNIFICANT CHANGE UP (ref 7–23)
CALCIUM SERPL-MCNC: 10.1 MG/DL — SIGNIFICANT CHANGE UP (ref 8.4–10.5)
CHLORIDE SERPL-SCNC: 105 MMOL/L — SIGNIFICANT CHANGE UP (ref 98–107)
CO2 SERPL-SCNC: 26 MMOL/L — SIGNIFICANT CHANGE UP (ref 22–31)
CREAT SERPL-MCNC: 0.76 MG/DL — SIGNIFICANT CHANGE UP (ref 0.5–1.3)
EGFR: 96 ML/MIN/1.73M2 — SIGNIFICANT CHANGE UP
GLUCOSE SERPL-MCNC: 92 MG/DL — SIGNIFICANT CHANGE UP (ref 70–99)
HCG UR QL: NEGATIVE — SIGNIFICANT CHANGE UP
HCT VFR BLD CALC: 44.5 % — SIGNIFICANT CHANGE UP (ref 34.5–45)
HGB BLD-MCNC: 15 G/DL — SIGNIFICANT CHANGE UP (ref 11.5–15.5)
MAGNESIUM SERPL-MCNC: 2 MG/DL — SIGNIFICANT CHANGE UP (ref 1.6–2.6)
MCHC RBC-ENTMCNC: 28.7 PG — SIGNIFICANT CHANGE UP (ref 27–34)
MCHC RBC-ENTMCNC: 33.7 GM/DL — SIGNIFICANT CHANGE UP (ref 32–36)
MCV RBC AUTO: 85.2 FL — SIGNIFICANT CHANGE UP (ref 80–100)
NRBC # BLD: 0 /100 WBCS — SIGNIFICANT CHANGE UP (ref 0–0)
NRBC # FLD: 0 K/UL — SIGNIFICANT CHANGE UP (ref 0–0)
PLATELET # BLD AUTO: 248 K/UL — SIGNIFICANT CHANGE UP (ref 150–400)
POTASSIUM SERPL-MCNC: 4.6 MMOL/L — SIGNIFICANT CHANGE UP (ref 3.5–5.3)
POTASSIUM SERPL-SCNC: 4.6 MMOL/L — SIGNIFICANT CHANGE UP (ref 3.5–5.3)
RBC # BLD: 5.22 M/UL — HIGH (ref 3.8–5.2)
RBC # FLD: 12.8 % — SIGNIFICANT CHANGE UP (ref 10.3–14.5)
SODIUM SERPL-SCNC: 140 MMOL/L — SIGNIFICANT CHANGE UP (ref 135–145)
WBC # BLD: 5.51 K/UL — SIGNIFICANT CHANGE UP (ref 3.8–10.5)
WBC # FLD AUTO: 5.51 K/UL — SIGNIFICANT CHANGE UP (ref 3.8–10.5)

## 2023-10-09 PROCEDURE — 93312 ECHO TRANSESOPHAGEAL: CPT | Mod: 26

## 2023-10-09 PROCEDURE — 93320 DOPPLER ECHO COMPLETE: CPT | Mod: 26,GC

## 2023-10-09 PROCEDURE — 93325 DOPPLER ECHO COLOR FLOW MAPG: CPT | Mod: 26,GC

## 2023-10-09 RX ORDER — SODIUM CHLORIDE 9 MG/ML
3 INJECTION INTRAMUSCULAR; INTRAVENOUS; SUBCUTANEOUS EVERY 8 HOURS
Refills: 0 | Status: DISCONTINUED | OUTPATIENT
Start: 2023-10-09 | End: 2023-10-23

## 2023-10-09 RX ORDER — METOPROLOL TARTRATE 50 MG
1 TABLET ORAL
Refills: 0 | DISCHARGE

## 2023-10-09 RX ORDER — APIXABAN 2.5 MG/1
1 TABLET, FILM COATED ORAL
Qty: 0 | Refills: 0 | DISCHARGE

## 2023-10-09 NOTE — H&P CARDIOLOGY - HISTORY OF PRESENT ILLNESS
47 year old F with PMH of Rheumatic heart disease S/P MVR 2000 then reop bio MVR/AVR in 2011 and AFib(on Eliquis) presented to [facility] for DELBERT to evaluate MV due to TTE showing possible MVR. Patient stated that she is DCCV 2/2023 which was successful but 3 weeks later was back in AFib. DELBERT done 2/2023 showed LVEF 67%, s/p AVR/MVR, minimal MR and AI. Patient then had an AF ablation 4/20/23 with Dr. Maher and since then has been in NSR. She is on Eliquis 5mg BID. Patient stated that the past 1 month she has been having ABBOTT and  even at rest. Patient stated that his ABBOTT is different from the prior SOB/ABBOTT when she had the Afib. Patient stated that when she climbs a flight of stairs or walks up a hill she would feel SOB and also weakness in her thighs. Patient stated that even at rest she does feel SOB and when she is laying on her side. Patient otherwise denied any CP, fevers, chills, N/V/D/C, abdominal pain, dysuria, melena, hematochezia, recent travel, sick contact, cough, body aches, pleuritic or positional chest pain.     NPO Date and Time: 10/08/2023 at 6:00pm   Mallampati: 2  Anticoagulation Date and Time? Eliquis 5mg on 10/9  Previous Endoscopy?  NO  Hx of CVA?  NO  Sleep Apnea?  NO  Dentures? NO  Loose Teeth? NO      Patient Denies:  Prior difficult intubation or airway problems  Odynophagia  Dysphagia  Esophageal stricture  Esophageal tumor  Esophageal varices  Esophageal perforation/laceration  Esophageal diverticulum  Large diaphragmatic Hernia  Active or recent upper gastrointestinal (GI) bleed  History of GI surgery

## 2023-10-12 ENCOUNTER — NON-APPOINTMENT (OUTPATIENT)
Age: 48
End: 2023-10-12

## 2023-10-12 DIAGNOSIS — I48.91 UNSPECIFIED ATRIAL FIBRILLATION: ICD-10-CM

## 2023-12-01 ENCOUNTER — NON-APPOINTMENT (OUTPATIENT)
Age: 48
End: 2023-12-01

## 2023-12-01 ENCOUNTER — APPOINTMENT (OUTPATIENT)
Dept: ELECTROPHYSIOLOGY | Facility: CLINIC | Age: 48
End: 2023-12-01
Payer: MEDICAID

## 2023-12-01 VITALS
WEIGHT: 178 LBS | BODY MASS INDEX: 28.61 KG/M2 | SYSTOLIC BLOOD PRESSURE: 112 MMHG | HEIGHT: 66 IN | HEART RATE: 88 BPM | DIASTOLIC BLOOD PRESSURE: 73 MMHG | OXYGEN SATURATION: 96 %

## 2023-12-01 PROCEDURE — 93000 ELECTROCARDIOGRAM COMPLETE: CPT

## 2023-12-01 PROCEDURE — 99213 OFFICE O/P EST LOW 20 MIN: CPT | Mod: 25

## 2023-12-01 RX ORDER — APIXABAN 5 MG/1
5 TABLET, FILM COATED ORAL
Qty: 180 | Refills: 3 | Status: ACTIVE | COMMUNITY
Start: 2023-01-17 | End: 1900-01-01

## 2024-01-31 NOTE — ASU PATIENT PROFILE, ADULT - SURGERY TIME
no lesions, no deformities, no traumatic injuries, no significant scars are present, chest wall non-tender, no masses present, breathing is unlabored without accessory muscle use,normal breath sounds 10:30 English

## 2024-03-01 ENCOUNTER — NON-APPOINTMENT (OUTPATIENT)
Age: 49
End: 2024-03-01

## 2024-03-01 ENCOUNTER — APPOINTMENT (OUTPATIENT)
Dept: CARDIOLOGY | Facility: CLINIC | Age: 49
End: 2024-03-01
Payer: MEDICAID

## 2024-03-01 VITALS
WEIGHT: 168 LBS | HEART RATE: 83 BPM | SYSTOLIC BLOOD PRESSURE: 124 MMHG | OXYGEN SATURATION: 98 % | DIASTOLIC BLOOD PRESSURE: 82 MMHG | HEIGHT: 66 IN | BODY MASS INDEX: 27 KG/M2

## 2024-03-01 DIAGNOSIS — Z95.2 PRESENCE OF PROSTHETIC HEART VALVE: ICD-10-CM

## 2024-03-01 DIAGNOSIS — I48.91 UNSPECIFIED ATRIAL FIBRILLATION: ICD-10-CM

## 2024-03-01 DIAGNOSIS — Z86.79 OTHER SPECIFIED POSTPROCEDURAL STATES: ICD-10-CM

## 2024-03-01 DIAGNOSIS — Z98.890 OTHER SPECIFIED POSTPROCEDURAL STATES: ICD-10-CM

## 2024-03-01 PROCEDURE — 93306 TTE W/DOPPLER COMPLETE: CPT

## 2024-03-01 PROCEDURE — 99214 OFFICE O/P EST MOD 30 MIN: CPT | Mod: 25

## 2024-03-01 PROCEDURE — 93000 ELECTROCARDIOGRAM COMPLETE: CPT

## 2024-03-01 RX ORDER — METOPROLOL SUCCINATE 25 MG/1
25 TABLET, EXTENDED RELEASE ORAL DAILY
Qty: 60 | Refills: 1 | Status: DISCONTINUED | COMMUNITY
Start: 2023-09-05 | End: 2024-03-01

## 2024-03-08 ENCOUNTER — APPOINTMENT (OUTPATIENT)
Dept: ELECTROPHYSIOLOGY | Facility: CLINIC | Age: 49
End: 2024-03-08
Payer: MEDICAID

## 2024-03-08 ENCOUNTER — NON-APPOINTMENT (OUTPATIENT)
Age: 49
End: 2024-03-08

## 2024-03-08 VITALS
BODY MASS INDEX: 26.36 KG/M2 | HEIGHT: 66 IN | SYSTOLIC BLOOD PRESSURE: 127 MMHG | WEIGHT: 164 LBS | HEART RATE: 81 BPM | OXYGEN SATURATION: 93 % | DIASTOLIC BLOOD PRESSURE: 90 MMHG

## 2024-03-08 PROCEDURE — 99213 OFFICE O/P EST LOW 20 MIN: CPT | Mod: 25

## 2024-03-08 PROCEDURE — 93000 ELECTROCARDIOGRAM COMPLETE: CPT

## 2024-03-08 NOTE — PHYSICAL EXAM
[Well Developed] : well developed [Well Nourished] : well nourished [No Acute Distress] : no acute distress [Normal Venous Pressure] : normal venous pressure [Normal Conjunctiva] : normal conjunctiva [No Carotid Bruit] : no carotid bruit [Normal] : normal S1, S2, no murmur, no rub, no gallop [Normal S1, S2] : normal S1, S2 [No Gallop] : no gallop [No Rub] : no rub [Murmur] : murmur [Clear Lung Fields] : clear lung fields [Good Air Entry] : good air entry [No Respiratory Distress] : no respiratory distress  [Soft] : abdomen soft [Non Tender] : non-tender [No Masses/organomegaly] : no masses/organomegaly [Normal Gait] : normal gait [Normal Bowel Sounds] : normal bowel sounds [No Cyanosis] : no cyanosis [No Edema] : no edema [No Clubbing] : no clubbing [No Rash] : no rash [No Varicosities] : no varicosities [No Skin Lesions] : no skin lesions [No Focal Deficits] : no focal deficits [Moves all extremities] : moves all extremities [Normal Speech] : normal speech [Alert and Oriented] : alert and oriented [Normal memory] : normal memory [de-identified] : 1/6 systolic murmur

## 2024-03-08 NOTE — DISCUSSION/SUMMARY
[FreeTextEntry1] : IMPRESSION:  1. Persistent afib: s/p PVI ablation on 4/20/2023. EKG performed today to assess for conduction disease reveals NSR. Concern about long term use of anticoagulation. Patient has a history of MVR/AVR and is back on anticoagulation. Was very symptomatic in afib. Can consider [employer] Watch vs [employer] for her mobile phone or ILR for long term monitoring of afib to possibly allow for safe discontinuation of AC in future. Risks, benefits, and alternatives discussed. Patient has a history of bio-MVR/AVR.  [EKG obtained to assist in diagnosis and management of assessed problem(s)] : EKG obtained to assist in diagnosis and management of assessed problem(s)

## 2024-03-08 NOTE — CARDIOLOGY SUMMARY
[de-identified] : 2/6/2023: Conclusions: \par  1. Bioprosthetic mitral valve replacement, which is well\par  seated with normal leaflet motion. Minimal mitral\par  regurgitation. Mean transmitral valve gradient equals 4 mm\par  Hg, which is probably normal in the setting of a prosthetic\par  valve.\par  2. Bioprosthetic aortic valve replacement, which appears\par  well seated.  Leaflet motion grossly appears normal. Peak\par  transaortic valve gradient equals 22 mm Hg, mean\par  transaortic valve gradient equals 12 mm Hg, which is\par  probably normal in the presence of a prosthetic valve.\par  Minimal aortic regurgitation. \par  3. Normal aortic root.  Mild non-mobile atherosclerotic\par  plaque in the aortic arch and descending thoracic aorta.\par  4. Moderately dilated left atrium.  LA volume index = 42\par  cc/m2.  No left atrium or left atrial appendage thrombus.\par  5. Normal left ventricular internal dimensions and wall\par  thicknesses.\par  6. Normal left ventricular systolic function. No segmental\par  wall motion abnormalities.\par  7. Normal right ventricular size and function.\par  8. Contrast injection demonstrates no evidence of a patent\par  foramen ovale.

## 2024-03-08 NOTE — HISTORY OF PRESENT ILLNESS
[FreeTextEntry1] : Geneva Hernandez is a 47y/o woman with Hx of rheumatic heart disease S/P MVR then reop bio-MVR/AVR in 2011 and persistent Afib s/p DELBERT/DCCV on 2/6/23 and now s/p PVI ablation on 4/20/2023 who presents today for routine follow-up. Admits doing well post procedure. Was feeling SOB when in afib and now all symptoms have resolved. Did have brief palpitations post ablation, apc's, but no sustained symptoms and palpitations have now resolved as well. Remains on Eliquis without s/s of bleeding. Denies chest pain, palpitations, SOB, syncope or near syncope. Questioning need for long term use of AC, but when patient started getting symptoms again, anticoagulation was restarted.   Been feeling well.  Stopped metoprolol because triglycerides increased but has not repeated levels yet off beta blocker.  She has not had chest pain, dyspnea, palpitations, lightheadedness, syncope, near syncope or unusual fatigue. Last dyspnea, while she had the monitor which showed PACs.  Patient was started on beta blocker at that time but is no longer taking metoprolol. She does take Eliquis. There has been no documented AF since ablation, and it has been 11 months since the ablation.

## 2024-04-15 NOTE — ASU DISCHARGE PLAN (ADULT/PEDIATRIC) - NS DC INTERPRETER YES NO
Your foot xray shows tendonitis and arthritis to the foot. Follow up with podiatrist a scheduled for further evaluation. Take tylenol as needed for the pain and use topical diclofenac 4 x a day. Prescription has been sent to your pharmacy No

## 2024-07-01 ENCOUNTER — NON-APPOINTMENT (OUTPATIENT)
Age: 49
End: 2024-07-01

## 2024-09-05 ENCOUNTER — NON-APPOINTMENT (OUTPATIENT)
Age: 49
End: 2024-09-05

## 2024-09-06 ENCOUNTER — NON-APPOINTMENT (OUTPATIENT)
Age: 49
End: 2024-09-06

## 2024-09-06 ENCOUNTER — APPOINTMENT (OUTPATIENT)
Dept: ELECTROPHYSIOLOGY | Facility: CLINIC | Age: 49
End: 2024-09-06
Payer: MEDICAID

## 2024-09-06 VITALS
OXYGEN SATURATION: 97 % | DIASTOLIC BLOOD PRESSURE: 81 MMHG | WEIGHT: 184 LBS | BODY MASS INDEX: 29.57 KG/M2 | HEART RATE: 72 BPM | SYSTOLIC BLOOD PRESSURE: 133 MMHG | HEIGHT: 66 IN

## 2024-09-06 DIAGNOSIS — I48.19 OTHER PERSISTENT ATRIAL FIBRILLATION: ICD-10-CM

## 2024-09-06 DIAGNOSIS — Z98.890 OTHER SPECIFIED POSTPROCEDURAL STATES: ICD-10-CM

## 2024-09-06 DIAGNOSIS — Z86.79 OTHER SPECIFIED POSTPROCEDURAL STATES: ICD-10-CM

## 2024-09-06 PROCEDURE — 99213 OFFICE O/P EST LOW 20 MIN: CPT | Mod: 25

## 2024-09-06 PROCEDURE — 93000 ELECTROCARDIOGRAM COMPLETE: CPT

## 2024-09-06 NOTE — DISCUSSION/SUMMARY
[FreeTextEntry1] : IMPRESSION:  1. Persistent afib: s/p PVI ablation on 4/20/2023. EKG performed today to assess for conduction disease reveals NSR. Patient has a history of MVR/AVR. Remains on Eliquis, off metoprolol.  Continue f/u with Cardiologist and RTO for f/u in 6 months. [EKG obtained to assist in diagnosis and management of assessed problem(s)] : EKG obtained to assist in diagnosis and management of assessed problem(s)

## 2024-09-06 NOTE — PHYSICAL EXAM
[Well Developed] : well developed [Well Nourished] : well nourished [No Acute Distress] : no acute distress [Normal Conjunctiva] : normal conjunctiva [Normal Venous Pressure] : normal venous pressure [No Carotid Bruit] : no carotid bruit [Normal] : normal S1, S2, no murmur, no rub, no gallop [Normal S1, S2] : normal S1, S2 [No Rub] : no rub [No Gallop] : no gallop [Murmur] : murmur [Clear Lung Fields] : clear lung fields [Good Air Entry] : good air entry [No Respiratory Distress] : no respiratory distress  [Soft] : abdomen soft [Non Tender] : non-tender [No Masses/organomegaly] : no masses/organomegaly [Normal Bowel Sounds] : normal bowel sounds [No Edema] : no edema [Normal Gait] : normal gait [No Cyanosis] : no cyanosis [No Clubbing] : no clubbing [No Varicosities] : no varicosities [No Rash] : no rash [No Skin Lesions] : no skin lesions [Moves all extremities] : moves all extremities [No Focal Deficits] : no focal deficits [Normal Speech] : normal speech [Alert and Oriented] : alert and oriented [Normal memory] : normal memory [de-identified] : 1/6 systolic murmur

## 2024-09-06 NOTE — HISTORY OF PRESENT ILLNESS
[FreeTextEntry1] : Geneva Hernandez is a 49y/o woman with Hx of rheumatic heart disease S/P MVR then reop bio-MVR/AVR in 2011 and persistent Afib s/p DELBERT/DCCV on 2/6/23 and now s/p PVI ablation on 4/20/2023 who presents today for routine follow-up. Admits doing well post procedure. Was feeling SOB when in afib and now all symptoms have resolved. Did have brief palpitations post ablation, apc's, but no sustained symptoms and palpitations have now resolved as well. Remains on Eliquis without s/s of bleeding.  Off metoprolol given change in triglycerides with use. Denies chest pain, palpitations, SOB, syncope or near syncope.

## 2024-09-06 NOTE — CARDIOLOGY SUMMARY
[de-identified] : 2/6/2023: Conclusions: \par  1. Bioprosthetic mitral valve replacement, which is well\par  seated with normal leaflet motion. Minimal mitral\par  regurgitation. Mean transmitral valve gradient equals 4 mm\par  Hg, which is probably normal in the setting of a prosthetic\par  valve.\par  2. Bioprosthetic aortic valve replacement, which appears\par  well seated.  Leaflet motion grossly appears normal. Peak\par  transaortic valve gradient equals 22 mm Hg, mean\par  transaortic valve gradient equals 12 mm Hg, which is\par  probably normal in the presence of a prosthetic valve.\par  Minimal aortic regurgitation. \par  3. Normal aortic root.  Mild non-mobile atherosclerotic\par  plaque in the aortic arch and descending thoracic aorta.\par  4. Moderately dilated left atrium.  LA volume index = 42\par  cc/m2.  No left atrium or left atrial appendage thrombus.\par  5. Normal left ventricular internal dimensions and wall\par  thicknesses.\par  6. Normal left ventricular systolic function. No segmental\par  wall motion abnormalities.\par  7. Normal right ventricular size and function.\par  8. Contrast injection demonstrates no evidence of a patent\par  foramen ovale.

## 2025-04-14 NOTE — H&P PST ADULT - ATTENDING COMMENTS
Will hopefully see some improvement with the weight loss anticipated with the mounjaro.           Patient seen in the presurgical holding area with her family present for the informed consent discussion.  R/B/A were reviewed & understood with the consent signed & witnessed in the chart.  Plan to perform short interval repeat pelvic imaging of the ovarian cyst prior to determining if intervention is warranted.    Bailey Avendaño MD Patient seen in the presurgical holding area with her two children (15 & 14 years old son and daughter respectively) present for the informed consent discussion.  R/B/A were reviewed & understood with the consent signed & witnessed in the chart.  Plan to perform short interval repeat pelvic imaging of the ovarian cyst prior to determining if intervention is warranted.  Patient took the Clindamycin PO this morning as prophylaxis for her valve replacements.    Bailey vAendaño MD